# Patient Record
Sex: MALE | Race: WHITE
[De-identification: names, ages, dates, MRNs, and addresses within clinical notes are randomized per-mention and may not be internally consistent; named-entity substitution may affect disease eponyms.]

---

## 2017-09-09 ENCOUNTER — HOSPITAL ENCOUNTER (INPATIENT)
Dept: HOSPITAL 17 - PHED | Age: 77
LOS: 6 days | Discharge: HOME | DRG: 274 | End: 2017-09-15
Attending: HOSPITALIST | Admitting: HOSPITALIST
Payer: MEDICARE

## 2017-09-09 VITALS
DIASTOLIC BLOOD PRESSURE: 76 MMHG | SYSTOLIC BLOOD PRESSURE: 127 MMHG | RESPIRATION RATE: 17 BRPM | OXYGEN SATURATION: 98 % | HEART RATE: 98 BPM

## 2017-09-09 VITALS
HEART RATE: 97 BPM | TEMPERATURE: 98.5 F | RESPIRATION RATE: 20 BRPM | SYSTOLIC BLOOD PRESSURE: 119 MMHG | DIASTOLIC BLOOD PRESSURE: 71 MMHG | OXYGEN SATURATION: 98 %

## 2017-09-09 VITALS
OXYGEN SATURATION: 97 % | HEART RATE: 80 BPM | DIASTOLIC BLOOD PRESSURE: 65 MMHG | SYSTOLIC BLOOD PRESSURE: 121 MMHG | RESPIRATION RATE: 18 BRPM

## 2017-09-09 VITALS
RESPIRATION RATE: 18 BRPM | HEART RATE: 80 BPM | DIASTOLIC BLOOD PRESSURE: 87 MMHG | SYSTOLIC BLOOD PRESSURE: 132 MMHG | OXYGEN SATURATION: 98 %

## 2017-09-09 VITALS
RESPIRATION RATE: 18 BRPM | SYSTOLIC BLOOD PRESSURE: 125 MMHG | DIASTOLIC BLOOD PRESSURE: 71 MMHG | OXYGEN SATURATION: 98 % | HEART RATE: 82 BPM

## 2017-09-09 VITALS
SYSTOLIC BLOOD PRESSURE: 133 MMHG | RESPIRATION RATE: 18 BRPM | OXYGEN SATURATION: 99 % | HEART RATE: 100 BPM | DIASTOLIC BLOOD PRESSURE: 82 MMHG

## 2017-09-09 VITALS — WEIGHT: 160.94 LBS | BODY MASS INDEX: 20.65 KG/M2 | HEIGHT: 74 IN

## 2017-09-09 VITALS
RESPIRATION RATE: 18 BRPM | HEART RATE: 64 BPM | OXYGEN SATURATION: 98 % | DIASTOLIC BLOOD PRESSURE: 65 MMHG | SYSTOLIC BLOOD PRESSURE: 116 MMHG

## 2017-09-09 VITALS
DIASTOLIC BLOOD PRESSURE: 76 MMHG | RESPIRATION RATE: 16 BRPM | SYSTOLIC BLOOD PRESSURE: 127 MMHG | OXYGEN SATURATION: 98 % | TEMPERATURE: 98 F | HEART RATE: 101 BPM

## 2017-09-09 VITALS — HEART RATE: 98 BPM

## 2017-09-09 VITALS
HEART RATE: 55 BPM | OXYGEN SATURATION: 98 % | RESPIRATION RATE: 16 BRPM | SYSTOLIC BLOOD PRESSURE: 106 MMHG | DIASTOLIC BLOOD PRESSURE: 62 MMHG | TEMPERATURE: 97.5 F

## 2017-09-09 VITALS — HEART RATE: 96 BPM

## 2017-09-09 VITALS — HEART RATE: 66 BPM

## 2017-09-09 DIAGNOSIS — Z91.040: ICD-10-CM

## 2017-09-09 DIAGNOSIS — K59.00: ICD-10-CM

## 2017-09-09 DIAGNOSIS — I48.91: ICD-10-CM

## 2017-09-09 DIAGNOSIS — R91.1: ICD-10-CM

## 2017-09-09 DIAGNOSIS — I34.0: ICD-10-CM

## 2017-09-09 DIAGNOSIS — I11.0: ICD-10-CM

## 2017-09-09 DIAGNOSIS — I48.92: ICD-10-CM

## 2017-09-09 DIAGNOSIS — R63.4: ICD-10-CM

## 2017-09-09 DIAGNOSIS — E78.5: ICD-10-CM

## 2017-09-09 DIAGNOSIS — Z87.891: ICD-10-CM

## 2017-09-09 DIAGNOSIS — Z95.5: ICD-10-CM

## 2017-09-09 DIAGNOSIS — I34.1: ICD-10-CM

## 2017-09-09 DIAGNOSIS — E03.9: ICD-10-CM

## 2017-09-09 DIAGNOSIS — I25.110: Primary | ICD-10-CM

## 2017-09-09 DIAGNOSIS — I25.2: ICD-10-CM

## 2017-09-09 DIAGNOSIS — Z95.1: ICD-10-CM

## 2017-09-09 DIAGNOSIS — I50.9: ICD-10-CM

## 2017-09-09 LAB
ANION GAP SERPL CALC-SCNC: 7 MEQ/L (ref 5–15)
APTT BLD: 36.6 SEC (ref 24.3–30.1)
APTT BLD: 86.8 SEC (ref 24.3–30.1)
BASOPHILS # BLD AUTO: 0 TH/MM3 (ref 0–0.2)
BASOPHILS NFR BLD: 0.2 % (ref 0–2)
BUN SERPL-MCNC: 25 MG/DL (ref 7–18)
CHLORIDE SERPL-SCNC: 103 MEQ/L (ref 98–107)
CK MB SERPL-MCNC: 10.8 NG/ML (ref 0.5–3.6)
CK SERPL-CCNC: 474 U/L (ref 39–308)
DIGOXIN SERPL-MCNC: 0.7 NG/ML (ref 0.8–2)
EOSINOPHIL # BLD: 0 TH/MM3 (ref 0–0.4)
EOSINOPHIL NFR BLD: 0.5 % (ref 0–4)
ERYTHROCYTE [DISTWIDTH] IN BLOOD BY AUTOMATED COUNT: 14.9 % (ref 11.6–17.2)
GFR SERPLBLD BASED ON 1.73 SQ M-ARVRAT: 54 ML/MIN (ref 89–?)
HCO3 BLD-SCNC: 29.2 MEQ/L (ref 21–32)
HCT VFR BLD CALC: 44.6 % (ref 39–51)
HEMO FLAGS: (no result)
INR PPP: 1.1 RATIO
LYMPHOCYTES # BLD AUTO: 1.3 TH/MM3 (ref 1–4.8)
LYMPHOCYTES NFR BLD AUTO: 21.7 % (ref 9–44)
MCH RBC QN AUTO: 29.1 PG (ref 27–34)
MCHC RBC AUTO-ENTMCNC: 32.7 % (ref 32–36)
MCV RBC AUTO: 88.9 FL (ref 80–100)
MONOCYTES NFR BLD: 10.7 % (ref 0–8)
NEUTROPHILS # BLD AUTO: 4 TH/MM3 (ref 1.8–7.7)
NEUTROPHILS NFR BLD AUTO: 66.9 % (ref 16–70)
PLATELET # BLD: 199 TH/MM3 (ref 150–450)
POTASSIUM SERPL-SCNC: 3.9 MEQ/L (ref 3.5–5.1)
PROTHROMBIN TIME: 12.3 SEC (ref 9.8–11.6)
RBC # BLD AUTO: 5.01 MIL/MM3 (ref 4.5–5.9)
SODIUM SERPL-SCNC: 139 MEQ/L (ref 136–145)
WBC # BLD AUTO: 5.9 TH/MM3 (ref 4–11)

## 2017-09-09 PROCEDURE — 74000: CPT

## 2017-09-09 PROCEDURE — 93623 PRGRMD STIMJ&PACG IV RX NFS: CPT

## 2017-09-09 PROCEDURE — C1766 INTRO/SHEATH,STRBLE,NON-PEEL: HCPCS

## 2017-09-09 PROCEDURE — 71250 CT THORAX DX C-: CPT

## 2017-09-09 PROCEDURE — 84484 ASSAY OF TROPONIN QUANT: CPT

## 2017-09-09 PROCEDURE — 78452 HT MUSCLE IMAGE SPECT MULT: CPT

## 2017-09-09 PROCEDURE — C1759 CATH, INTRA ECHOCARDIOGRAPHY: HCPCS

## 2017-09-09 PROCEDURE — 85730 THROMBOPLASTIN TIME PARTIAL: CPT

## 2017-09-09 PROCEDURE — 80048 BASIC METABOLIC PNL TOTAL CA: CPT

## 2017-09-09 PROCEDURE — 80061 LIPID PANEL: CPT

## 2017-09-09 PROCEDURE — 93662 INTRACARDIAC ECG (ICE): CPT

## 2017-09-09 PROCEDURE — 85025 COMPLETE CBC W/AUTO DIFF WBC: CPT

## 2017-09-09 PROCEDURE — 99285 EMERGENCY DEPT VISIT HI MDM: CPT

## 2017-09-09 PROCEDURE — 93320 DOPPLER ECHO COMPLETE: CPT

## 2017-09-09 PROCEDURE — 93306 TTE W/DOPPLER COMPLETE: CPT

## 2017-09-09 PROCEDURE — C1731 CATH, EP, 20 OR MORE ELEC: HCPCS

## 2017-09-09 PROCEDURE — 93005 ELECTROCARDIOGRAM TRACING: CPT

## 2017-09-09 PROCEDURE — C1732 CATH, EP, DIAG/ABL, 3D/VECT: HCPCS

## 2017-09-09 PROCEDURE — 93325 DOPPLER ECHO COLOR FLOW MAPG: CPT

## 2017-09-09 PROCEDURE — 83880 ASSAY OF NATRIURETIC PEPTIDE: CPT

## 2017-09-09 PROCEDURE — A9502 TC99M TETROFOSMIN: HCPCS

## 2017-09-09 PROCEDURE — 71010: CPT

## 2017-09-09 PROCEDURE — 93459 L HRT ART/GRFT ANGIO: CPT

## 2017-09-09 PROCEDURE — C1730 CATH, EP, 19 OR FEW ELECT: HCPCS

## 2017-09-09 PROCEDURE — 93613 INTRACARDIAC EPHYS 3D MAPG: CPT

## 2017-09-09 PROCEDURE — 85027 COMPLETE CBC AUTOMATED: CPT

## 2017-09-09 PROCEDURE — C1769 GUIDE WIRE: HCPCS

## 2017-09-09 PROCEDURE — 92960 CARDIOVERSION ELECTRIC EXT: CPT

## 2017-09-09 PROCEDURE — 80162 ASSAY OF DIGOXIN TOTAL: CPT

## 2017-09-09 PROCEDURE — 82550 ASSAY OF CK (CPK): CPT

## 2017-09-09 PROCEDURE — C2630 CATH, EP, COOL-TIP: HCPCS

## 2017-09-09 PROCEDURE — 85002 BLEEDING TIME TEST: CPT

## 2017-09-09 PROCEDURE — 93656 COMPRE EP EVAL ABLTJ ATR FIB: CPT

## 2017-09-09 PROCEDURE — C1893 INTRO/SHEATH, FIXED,NON-PEEL: HCPCS

## 2017-09-09 PROCEDURE — 82552 ASSAY OF CPK IN BLOOD: CPT

## 2017-09-09 PROCEDURE — 85610 PROTHROMBIN TIME: CPT

## 2017-09-09 PROCEDURE — 93312 ECHO TRANSESOPHAGEAL: CPT

## 2017-09-09 PROCEDURE — 93017 CV STRESS TEST TRACING ONLY: CPT

## 2017-09-09 RX ADMIN — ATORVASTATIN CALCIUM SCH MG: 40 TABLET, FILM COATED ORAL at 21:04

## 2017-09-09 RX ADMIN — METOPROLOL SUCCINATE SCH MG: 25 TABLET, EXTENDED RELEASE ORAL at 21:03

## 2017-09-09 RX ADMIN — DOCUSATE SODIUM SCH MG: 100 CAPSULE, LIQUID FILLED ORAL at 21:04

## 2017-09-09 RX ADMIN — PHENYTOIN SODIUM SCH MLS/HR: 50 INJECTION INTRAMUSCULAR; INTRAVENOUS at 21:03
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
## 2017-09-09 NOTE — RADRPT
EXAM DATE/TIME:  09/09/2017 20:12 

 

[facility] COMPARISON:     

No previous studies available for comparison.

 

                     

INDICATIONS :     

Abdominal pain.

                     

 

MEDICAL HISTORY :     

Myocardial infarction.          

 

SURGICAL HISTORY :     

CABG.   

 

ENCOUNTER:     

Subsequent                                        

 

ACUITY:     

3 months      

 

PAIN SCORE:     

3/10

 

LOCATION:       

Abdomen.

 

FINDINGS:     

Nonspecific gas pattern is seen with gas throughout the gastrointestinal tract. Moderate amount of re
tained stool is present throughout the colon.

 

There is post of pathologic distention free air or mass effect.

 

CONCLUSION:     

Nonspecific gas pattern with moderate amount retained stool in the colon.

 

No evidence of significant ileus or free air.

 

 

 

 Dane Tom MD on September 09, 2017 at 20:44           

Board Certified Radiologist.

 This report was verified electronically.
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
## 2017-09-09 NOTE — RADRPT
EXAM DATE/TIME:  09/09/2017 15:27 

 

[facility] COMPARISON:     

No previous studies available for comparison.

 

                     

INDICATIONS :     

Short of breath, chest pains for 2 months, worse today

                     

 

MEDICAL HISTORY :     

Myocardial infarction.          

 

SURGICAL HISTORY :     

CABG.   

 

ENCOUNTER:     

Initial                                        

 

ACUITY:     

2 months      

 

PAIN SCORE:     

6/10

 

LOCATION:     

Bilateral chest 

 

FINDINGS:     

Single AP view of the chest. 2.8 cm nodular density in the right upper lung zone may represent bony h
ypertrophy at the first costochondral junction. Lungs otherwise clear. Median sternotomy wires. Cardi
omediastinal silhouette within normal limits. Minimal blunting left costophrenic sulcus. No evidence 
of pneumothorax.

 

CONCLUSION:     

1. Nodular density right upper lung zone in region of first costochondral junction may represent bony
 hypertrophy. Recommend followup PA and lateral views.

 

2. Minimal blunting left costophrenic sulcus indicating small pleural effusion versus scarring. 

 

 Carl Lopez MD on September 09, 2017 at 15:38           

Board Certified Radiologist.

 This report was verified electronically.

## 2017-09-10 VITALS — HEART RATE: 75 BPM

## 2017-09-10 VITALS — HEART RATE: 63 BPM

## 2017-09-10 VITALS
DIASTOLIC BLOOD PRESSURE: 77 MMHG | RESPIRATION RATE: 18 BRPM | SYSTOLIC BLOOD PRESSURE: 142 MMHG | TEMPERATURE: 97.6 F | OXYGEN SATURATION: 96 % | HEART RATE: 74 BPM

## 2017-09-10 VITALS
RESPIRATION RATE: 18 BRPM | HEART RATE: 102 BPM | OXYGEN SATURATION: 97 % | SYSTOLIC BLOOD PRESSURE: 108 MMHG | DIASTOLIC BLOOD PRESSURE: 63 MMHG | TEMPERATURE: 97.8 F

## 2017-09-10 VITALS — HEART RATE: 99 BPM

## 2017-09-10 VITALS
DIASTOLIC BLOOD PRESSURE: 55 MMHG | TEMPERATURE: 97.6 F | HEART RATE: 76 BPM | RESPIRATION RATE: 16 BRPM | OXYGEN SATURATION: 97 % | SYSTOLIC BLOOD PRESSURE: 95 MMHG

## 2017-09-10 VITALS
SYSTOLIC BLOOD PRESSURE: 128 MMHG | OXYGEN SATURATION: 97 % | TEMPERATURE: 97.6 F | HEART RATE: 84 BPM | DIASTOLIC BLOOD PRESSURE: 55 MMHG | RESPIRATION RATE: 18 BRPM

## 2017-09-10 VITALS
TEMPERATURE: 98.5 F | RESPIRATION RATE: 16 BRPM | SYSTOLIC BLOOD PRESSURE: 114 MMHG | DIASTOLIC BLOOD PRESSURE: 63 MMHG | HEART RATE: 70 BPM | OXYGEN SATURATION: 98 %

## 2017-09-10 VITALS — HEART RATE: 66 BPM

## 2017-09-10 VITALS — HEART RATE: 67 BPM

## 2017-09-10 VITALS
OXYGEN SATURATION: 98 % | TEMPERATURE: 98.4 F | RESPIRATION RATE: 18 BRPM | DIASTOLIC BLOOD PRESSURE: 88 MMHG | SYSTOLIC BLOOD PRESSURE: 126 MMHG | HEART RATE: 86 BPM

## 2017-09-10 VITALS — HEART RATE: 58 BPM

## 2017-09-10 VITALS — HEART RATE: 100 BPM

## 2017-09-10 VITALS — HEART RATE: 55 BPM

## 2017-09-10 VITALS — HEART RATE: 50 BPM

## 2017-09-10 VITALS — HEART RATE: 94 BPM

## 2017-09-10 VITALS — HEART RATE: 60 BPM

## 2017-09-10 VITALS — HEART RATE: 82 BPM

## 2017-09-10 VITALS — HEART RATE: 87 BPM

## 2017-09-10 VITALS — HEART RATE: 53 BPM

## 2017-09-10 LAB
ANION GAP SERPL CALC-SCNC: 5 MEQ/L (ref 5–15)
APTT BLD: 45.1 SEC (ref 24.3–30.1)
APTT BLD: 49.6 SEC (ref 24.3–30.1)
BASOPHILS # BLD AUTO: 0 TH/MM3 (ref 0–0.2)
BASOPHILS NFR BLD: 0.5 % (ref 0–2)
BUN SERPL-MCNC: 22 MG/DL (ref 7–18)
CHLORIDE SERPL-SCNC: 106 MEQ/L (ref 98–107)
CK MB SERPL-MCNC: 9.2 NG/ML (ref 0.5–3.6)
CK SERPL-CCNC: 359 U/L (ref 39–308)
EOSINOPHIL # BLD: 0.1 TH/MM3 (ref 0–0.4)
EOSINOPHIL NFR BLD: 2.9 % (ref 0–4)
ERYTHROCYTE [DISTWIDTH] IN BLOOD BY AUTOMATED COUNT: 14.8 % (ref 11.6–17.2)
GFR SERPLBLD BASED ON 1.73 SQ M-ARVRAT: 80 ML/MIN (ref 89–?)
HCO3 BLD-SCNC: 28.1 MEQ/L (ref 21–32)
HCT VFR BLD CALC: 41.3 % (ref 39–51)
HDLC SERPL-MCNC: 72 MG/DL (ref 40–60)
HEMO FLAGS: (no result)
LDLC SERPL-MCNC: 43 MG/DL (ref 0–99)
LYMPHOCYTES # BLD AUTO: 1.2 TH/MM3 (ref 1–4.8)
LYMPHOCYTES NFR BLD AUTO: 31.7 % (ref 9–44)
MCH RBC QN AUTO: 29.7 PG (ref 27–34)
MCHC RBC AUTO-ENTMCNC: 33.1 % (ref 32–36)
MCV RBC AUTO: 89.7 FL (ref 80–100)
MONOCYTES NFR BLD: 11.1 % (ref 0–8)
NEUTROPHILS # BLD AUTO: 2 TH/MM3 (ref 1.8–7.7)
NEUTROPHILS NFR BLD AUTO: 53.8 % (ref 16–70)
PLATELET # BLD: 153 TH/MM3 (ref 150–450)
POTASSIUM SERPL-SCNC: 4.4 MEQ/L (ref 3.5–5.1)
RBC # BLD AUTO: 4.6 MIL/MM3 (ref 4.5–5.9)
SODIUM SERPL-SCNC: 139 MEQ/L (ref 136–145)
WBC # BLD AUTO: 3.8 TH/MM3 (ref 4–11)

## 2017-09-10 RX ADMIN — DIGOXIN SCH MG: 125 TABLET ORAL at 08:32

## 2017-09-10 RX ADMIN — LEVOTHYROXINE SODIUM SCH MCG: 75 TABLET ORAL at 05:53

## 2017-09-10 RX ADMIN — DOCUSATE SODIUM SCH MG: 100 CAPSULE, LIQUID FILLED ORAL at 21:00

## 2017-09-10 RX ADMIN — DOCUSATE SODIUM SCH MG: 100 CAPSULE, LIQUID FILLED ORAL at 08:32

## 2017-09-10 RX ADMIN — ATORVASTATIN CALCIUM SCH MG: 40 TABLET, FILM COATED ORAL at 21:31

## 2017-09-10 RX ADMIN — ISOSORBIDE MONONITRATE SCH MG: 30 TABLET, EXTENDED RELEASE ORAL at 05:53

## 2017-09-10 RX ADMIN — ASPIRIN 81 MG SCH MG: 81 TABLET ORAL at 08:32

## 2017-09-10 RX ADMIN — DILTIAZEM HYDROCHLORIDE SCH MG: 240 CAPSULE, EXTENDED RELEASE ORAL at 08:32

## 2017-09-10 RX ADMIN — ACYCLOVIR SCH TAB: 800 TABLET ORAL at 08:31

## 2017-09-10 RX ADMIN — BISACODYL SCH MG: 5 TABLET, COATED ORAL at 08:31

## 2017-09-10 RX ADMIN — PHENYTOIN SODIUM SCH MLS/HR: 50 INJECTION INTRAMUSCULAR; INTRAVENOUS at 17:02

## 2017-09-10 RX ADMIN — METOPROLOL SUCCINATE SCH MG: 25 TABLET, EXTENDED RELEASE ORAL at 21:31

## 2017-09-10 NOTE — RADRPT
EXAM DATE/TIME:  09/10/2017 11:52 

 

[facility] COMPARISON:     

No previous studies available for comparison.

 

 

INDICATIONS :     

Chest pain for 1 day. Angina. Myocardial infarction.

                           

 

DOSE:     

25.5 mCi Tc99m Myoview at stress.

                     8.7 mCi Tc99m Myoview at rest.

                     0.4 mg Lexiscan

                       

 

 

STRESS SYMPTOMS:      

None.

                       

 

 

EJECTION FRACTION:       

47%

                       

 

MEDICAL HISTORY :     

Cardiovascular disease. Congestive heart failure. Hypertension. 

 

SURGICAL HISTORY :      

CABG Coronary artery stent.  

 

ENCOUNTER:     

Initial

 

ACUITY:     

1 day

 

PAIN SCALE:     

4/10

 

LOCATION:      

Bilateral chest 

 

TECHNIQUE:     

The patient underwent pharmacologic stress with infusion of prescribed dose.  Continuous ECG tracing 
was monitored during stress.  Gated SPECT imaging was performed after stress and conventional SPECT i
maging was performed at rest.  The examination was performed on a SPECT/CT scanner, both attenuation 
and non-corrected datasets were reviewed.

 

FINDINGS:     

 

DISTRIBUTION:     

The maximum perfused segment at stress is in the inferior wall.

 

PERFUSION STUDY:     

Larged fixed perfusion abnormality is identified involving the anterior wall, lateral wall and apex. 
There is mild reperfusion along the inferoseptal portion of the defect. No other reversible abnormali
ties are noted.

 

GATED STUDY:     

Moderate generalized hypokinesia is noted. Ejection fraction is 47%.

 

CONCLUSION:     

Large predominantly fixed perfusion defect involving the anterior wall, lateral wall and periapical r
egion. Mild reperfusion is identified along the inferoseptal and inferoapical region of the defect.

 

RISK CATEGORY:     High (>3% Annual Mortality Rate)

 

 

 

 

 Dane Tom MD on September 10, 2017 at 13:29           

Board Certified Radiologist.

 This report was verified electronically.

## 2017-09-10 NOTE — ECHRPT
Indication:   shortness of breath

 

 CONCLUSIONS

 The left ventricular systolic function is low normal with an estimated ejection fraction in the rang
e of 50-

 55%. 

 Wall thickness is measured at the upper limits of normal. 

 No regional wall motion abnormalities are present. 

 The left atrial size is moderately dilated. 

 The right atrial size is mildly dilated. 

 Bileaflet mitral valve prolapse  (posterior>anterior.) . 

 Mild thickening of the mitral valve leaflets

 Mitral annular calcification is present. 

 Mild eccentric mitral regurgitation; may be underestimated by TTE

 Aortic valve sclerosis is present. 

 Mild aortic valve regurgitation. 

 There is trace tricuspid valve regurgitation. 

 There is estimated mild pulmonary hypertension present (range 40-50 mmHg). 

 The inferior vena cava is dilated. 

 

 BP:  126   / 88      HR: 101                      Rhythm:           Atrial fibrillation

 

 MEASUREMENTS  (Male / Female) Normal Values       Technical Quality:Good

 2D ECHO

 LV Diastolic Diameter PLAX        5.6 cm                4.2 - 5.9 / 3.9 - 5.3 cm

 LV Systolic Diameter PLAX         4.1 cm                

 IVS Diastolic Thickness           1.2 cm                0.6 - 1.0 / 0.6 - 0.9 cm

 LVPW Diastolic Thickness          1.2 cm                0.6 - 1.0 / 0.6 - 0.9 cm

 LV Relative Wall Thickness        0.4                   

 RV Internal Dim ED PLAX           3.3 cm                

 LVOT Diameter                     2.0 cm                

 LA Systolic Diameter LX           5.2 cm                3.0 - 4.0 / 2.7 - 3.8 cm

 LV Ejection Fraction MOD 4C       54.8 %                

 LV Cardiac Index MOD 4C           4102.8 cm/minm     

 LV Ejection Fraction 4C AL        55.4 %                

 LV Cardiac Index 4C AL            4268.4 cm/minm     

 

 M-MODE

 Aortic Root Diameter MM           3.6 cm                

 LA Systolic Diameter MM           5.2 cm                

 LA Ao Ratio MM                    1.4                   

 AV Cusp Separation MM             2.3 cm                

 

 DOPPLER

 AV Peak Velocity                  115.0 cm/s            

 AV Peak Gradient                  5.3 mmHg              

 AI Peak Velocity                  436.0 cm/s            

 AI Peak Gradient                  76.0 mmHg             

 AI Pressure Half Time             456.0 ms              

 LVOT Peak Velocity                84.4 cm/s             

 LVOT Peak Gradient                2.8 mmHg              

 AV Area Cont Eq pk                2.3 cm               

 MV Area PHT                       4.0 cm               

 Mitral E Point Velocity           124.0 cm/s            

 Mitral A Point Velocity           54.8 cm/s             

 Mitral E to A Ratio               2.3                   

 LV E' Lateral Velocity            15.1 cm/s             

 Mitral E to LV E' Lateral Ratio   8.2                   

 LV E' Septal Velocity             2.3 cm/s              

 Mitral E to LV E' Septal Ratio    53.0                  

 TR Peak Velocity                  296.0 cm/s            

 TR Peak Gradient                  35.0 mmHg             

 

 

 FINDINGS

 

 LEFT VENTRICLE

 The left ventricular systolic function is low normal with an estimated ejection fraction in the rang
e of 50-

 55%. 

 Wall thickness is measured at the upper limits of normal. 

 No regional wall motion abnormalities are present. 

 

 RIGHT VENTRICLE

 Normal right ventricular size and systolic function.  

 

 LEFT ATRIUM

 The left atrial size is moderately dilated. 

 

 RIGHT ATRIUM

 The right atrial size is mildly dilated. 

 

 ATRIAL SEPTUM

 Normal atrial septal thickness without atrial level shunting by limited color doppler interrogation.
  

 

 AORTA

 The aortic root and proximal ascending aorta are normal in size on limited imaging.  

 

 MITRAL VALVE

 Bileaflet mitral valve prolapse. 

 Mild thickening of the mitral valve leaflets. 

 Mitral annular calcification is present. 

 Mild eccentric mitral regurgitation; may be underestimated by TTE

 

 AORTIC VALVE

 Aortic valve sclerosis is present. 

 Mild aortic valve regurgitation. 

 

 TRICUSPID VALVE

 There is trace tricuspid valve regurgitation. 

 There is estimated mild pulmonary hypertension present (range 40-50 mmHg). 

 

 PULMONARY VALVE

 The pulmonary valve is not well visualized.  

 

 VESSELS

 The inferior vena cava is dilated. 

 

 PERICARDIUM

 No pericardial effusion.  

 

 

 

 

  Choco Ruiz MD

  (Electronically Signed)

  Final Date:10 September 2017 10:59

## 2017-09-10 NOTE — EKG
Date Performed: 09/10/2017       Time Performed: 03:34:24

 

PTAGE:      77 years

 

EKG:      Atrial flutter Possible left atrial abnormality Left axis deviation Inferior infarct - age 
undetermined Lateral ST-T changes are nonspecific Low QRS voltages in limb leads Abnormal ECG

 

PREVIOUS TRACING       : 09/09/2017 21.23 Since prior tracing, ventricular response atrial flutter is
 slightly faster.

 

DOCTOR:   Choco Riuz  Interpretating Date/Time  09/10/2017 09:56:14

## 2017-09-10 NOTE — RADRPT
EXAM DATE/TIME:  09/10/2017 13:16 

 

[facility] COMPARISON:     

No previous studies available for comparison.

 

 

INDICATIONS :     

Abnormal chest xray.

                      

 

RADIATION DOSE:     

5.30 CTDIvol (mGy) 

 

 

MEDICAL HISTORY :     

Hypertension. Congestive heart failure. Myocardial infarction. Hypertension.

 

SURGICAL HISTORY :      

CABG  

 

ENCOUNTER:      

Initial

 

ACUITY:      

1 day

 

PAIN SCALE:      

0/10

 

LOCATION:        

chest 

 

TECHNIQUE:      

Volumetric scanning of the chest was performed.  Using automated exposure control and adjustment of t
he mA and/or kV according to patient size, radiation dose was kept as low as reasonably achievable to
 obtain optimal diagnostic quality images.  DICOM format image data is available electronically for r
eview and comparison.  

 

Follow-up recommendations for detected pulmonary nodules are based at a minimum on nodule size and pa
tient risk factors according to Fleischner Society Guidelines.

 

FINDINGS:     

 

LUNGS:     

Postsurgical pleural-parenchymal scarring is identified in the left lung base. 4 mm nodule is identif
ied in the superior segment of the left lower lobe. There is no evidence of consolidating airspace di
sease.

 

PLEURAE:     

Left basilar pleural scarring

 

MEDIASTINUM:     

Heart is mildly enlarged. Calcific coronary artery disease is noted. Postsurgical changes from prior 
CABG are identified.

 

AXILLAE:     

Within normal limits.  No lymphadenopathy.

 

MUSCULOSKELETAL:     

Within normal limits for patient age.

 

MISCELLANEOUS:     

Gallstones are identified in the gallbladder.

 

CONCLUSION:     

1. Left basilar postsurgical scarring following CABG.

2. Mild cardiomegaly.

3. No acute cardiopulmonary process.

4. 4 mm left lower lobe nodule

5. Cholelithiasis

 

 

 

 

 Dane Tom MD on September 10, 2017 at 14:18           

Board Certified Radiologist.

 This report was verified electronically.
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
## 2017-09-10 NOTE — EKG
Date Performed: 09/09/2017       Time Performed: 21:23:32

 

PTAGE:      77 years

 

EKG:      Atrial flutter with PVC(s) or aberrant ventricular conduction Possible left anterior fascic
ular block Inferior infarct - age undetermined Lateral ST-T changes are nonspecific Low QRS voltages 
in limb leads Abnormal ECG

 

PREVIOUS TRACING       : 09/09/2017 14.04 Since prior tracing, ventricular response atrial flutter is
 slower and PVCs are now seen.

 

DOCTOR:   Choco Ruiz  Interpretating Date/Time  09/10/2017 09:55:29

## 2017-09-10 NOTE — EKG
Date Performed: 09/09/2017       Time Performed: 14:04:28

 

PTAGE:      77 years

 

EKG:      Atrial flutter INTRAVENTRICULAR CONDUCTION DELAY ABNORMAL ECG

 

PREVIOUS TRACING       : 11/06/2013 05.03 Since prior tracing, atrial flutter has replaced Sinus rhyt
hm 

 

 and ST changes are slightly more prominent.

 

DOCTOR:   Choco Ruiz  Interpretating Date/Time  09/10/2017 09:55:07
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
## 2017-09-11 VITALS
OXYGEN SATURATION: 96 % | HEART RATE: 87 BPM | SYSTOLIC BLOOD PRESSURE: 155 MMHG | TEMPERATURE: 98.6 F | DIASTOLIC BLOOD PRESSURE: 77 MMHG | RESPIRATION RATE: 18 BRPM

## 2017-09-11 VITALS — HEART RATE: 66 BPM

## 2017-09-11 VITALS
RESPIRATION RATE: 16 BRPM | OXYGEN SATURATION: 98 % | DIASTOLIC BLOOD PRESSURE: 73 MMHG | SYSTOLIC BLOOD PRESSURE: 122 MMHG | HEART RATE: 61 BPM | TEMPERATURE: 97.8 F

## 2017-09-11 VITALS — HEART RATE: 77 BPM

## 2017-09-11 VITALS
HEART RATE: 100 BPM | OXYGEN SATURATION: 98 % | TEMPERATURE: 97.8 F | SYSTOLIC BLOOD PRESSURE: 129 MMHG | RESPIRATION RATE: 16 BRPM | DIASTOLIC BLOOD PRESSURE: 77 MMHG

## 2017-09-11 VITALS
DIASTOLIC BLOOD PRESSURE: 78 MMHG | OXYGEN SATURATION: 98 % | TEMPERATURE: 98.4 F | SYSTOLIC BLOOD PRESSURE: 118 MMHG | RESPIRATION RATE: 18 BRPM | HEART RATE: 95 BPM

## 2017-09-11 VITALS
DIASTOLIC BLOOD PRESSURE: 74 MMHG | HEART RATE: 74 BPM | SYSTOLIC BLOOD PRESSURE: 118 MMHG | RESPIRATION RATE: 18 BRPM | OXYGEN SATURATION: 99 % | TEMPERATURE: 98.3 F

## 2017-09-11 VITALS — HEART RATE: 98 BPM

## 2017-09-11 VITALS — HEART RATE: 100 BPM

## 2017-09-11 VITALS — OXYGEN SATURATION: 99 %

## 2017-09-11 VITALS
RESPIRATION RATE: 18 BRPM | SYSTOLIC BLOOD PRESSURE: 128 MMHG | OXYGEN SATURATION: 97 % | HEART RATE: 104 BPM | DIASTOLIC BLOOD PRESSURE: 70 MMHG

## 2017-09-11 VITALS — HEART RATE: 96 BPM

## 2017-09-11 VITALS — HEART RATE: 89 BPM

## 2017-09-11 VITALS — HEART RATE: 94 BPM

## 2017-09-11 VITALS — HEART RATE: 64 BPM

## 2017-09-11 VITALS — HEART RATE: 83 BPM

## 2017-09-11 VITALS — HEART RATE: 70 BPM

## 2017-09-11 VITALS — HEART RATE: 71 BPM

## 2017-09-11 LAB — APTT BLD: 46 SEC (ref 24.3–30.1)

## 2017-09-11 PROCEDURE — B2151ZZ FLUOROSCOPY OF LEFT HEART USING LOW OSMOLAR CONTRAST: ICD-10-PCS | Performed by: INTERNAL MEDICINE

## 2017-09-11 PROCEDURE — 4A023N7 MEASUREMENT OF CARDIAC SAMPLING AND PRESSURE, LEFT HEART, PERCUTANEOUS APPROACH: ICD-10-PCS | Performed by: INTERNAL MEDICINE

## 2017-09-11 PROCEDURE — B41F1ZZ FLUOROSCOPY OF RIGHT LOWER EXTREMITY ARTERIES USING LOW OSMOLAR CONTRAST: ICD-10-PCS | Performed by: INTERNAL MEDICINE

## 2017-09-11 PROCEDURE — B2131ZZ FLUOROSCOPY OF MULTIPLE CORONARY ARTERY BYPASS GRAFTS USING LOW OSMOLAR CONTRAST: ICD-10-PCS | Performed by: INTERNAL MEDICINE

## 2017-09-11 PROCEDURE — B2111ZZ FLUOROSCOPY OF MULTIPLE CORONARY ARTERIES USING LOW OSMOLAR CONTRAST: ICD-10-PCS | Performed by: INTERNAL MEDICINE

## 2017-09-11 PROCEDURE — B2181ZZ FLUOROSCOPY OF LEFT INTERNAL MAMMARY BYPASS GRAFT USING LOW OSMOLAR CONTRAST: ICD-10-PCS | Performed by: INTERNAL MEDICINE

## 2017-09-11 RX ADMIN — DOCUSATE SODIUM SCH MG: 100 CAPSULE, LIQUID FILLED ORAL at 08:58

## 2017-09-11 RX ADMIN — HEPARIN SODIUM PRN MLS/HR: 10000 INJECTION, SOLUTION INTRAVENOUS at 21:00

## 2017-09-11 RX ADMIN — ACYCLOVIR SCH TAB: 800 TABLET ORAL at 09:05

## 2017-09-11 RX ADMIN — METOPROLOL SUCCINATE SCH MG: 25 TABLET, EXTENDED RELEASE ORAL at 21:11

## 2017-09-11 RX ADMIN — ISOSORBIDE MONONITRATE SCH MG: 30 TABLET, EXTENDED RELEASE ORAL at 06:07

## 2017-09-11 RX ADMIN — DIGOXIN SCH MG: 125 TABLET ORAL at 08:57

## 2017-09-11 RX ADMIN — BISACODYL SCH MG: 5 TABLET, COATED ORAL at 08:58

## 2017-09-11 RX ADMIN — DOCUSATE SODIUM SCH MG: 100 CAPSULE, LIQUID FILLED ORAL at 21:10

## 2017-09-11 RX ADMIN — ATORVASTATIN CALCIUM SCH MG: 40 TABLET, FILM COATED ORAL at 21:10

## 2017-09-11 RX ADMIN — DILTIAZEM HYDROCHLORIDE SCH MG: 240 CAPSULE, EXTENDED RELEASE ORAL at 09:01

## 2017-09-11 RX ADMIN — ASPIRIN 81 MG SCH MG: 81 TABLET ORAL at 08:57

## 2017-09-11 RX ADMIN — LEVOTHYROXINE SODIUM SCH MCG: 75 TABLET ORAL at 06:07

## 2017-09-11 NOTE — MA
cc:

ERICA CHACON M.D.

****

 

 

DATE:

 

PROCEDURE PERFORMED

Left heart catheterization, left ventriculography, coronary angiography, bypass

graft angiography including left internal mammary arteriography, right femoral

angiography with Angio-Seal placement.

 

BRIEF HISTORY

Luc Wagner is a 77-year-old man with known coronary artery disease,

admitted to the hospital with angina and recurrent atrial flutter.  Nuclear

stress test was high-risk positive and for this reason, cardiac catheterization

was being performed.

 

DESCRIPTION OF PROCEDURE

The patient was brought to cardiac cath lab in a fasting state.  Using 1%

lidocaine for local anesthesia, a 6.5-Danish sheath was inserted in the right

femoral artery.  Left ventricular pressure was then recorded using a pigtail

catheter followed by left ventriculography and then a pullback.  Coronary

angiography was then completed using a left 5 Francois for left coronary artery

and a right 4 Francois for the right coronary artery.  Angiography of the two

vein grafts was performed using a right Francois catheter.  Angiography of

internal mammary bypass was performed using an MATTI catheter.  Angiography was

then obtained of the right femoral artery via the sheath followed by

uncomplicated Angio-Seal placement with good hemostasis.

There were no complications.

 

FINDINGS

 

I.   HEMODYNAMICS:  Left ventricular pressure was 100 systolic

     with an end-diastolic pressure of 8, aortic pressure was

     110/68 with a mean of 86.

 

II.  LEFT VENTRICULOGRAPHY:  Left ventriculography showed a

     peculiar shaped left ventricle.  Ejection fraction appears

     to be about 40%. There is 1+ mitral regurgitation.

 

 

 

 

III.  CORONARY ANGIOGRAPHY

 

The left main coronary artery is very short with flush occlusion of the ostium

of the LAD.

 

The circumflex artery is patent with irregularities throughout.

 

The right coronary artery is dominant with 30% proximal and 20% proximal to mid

stenoses and mild irregularities.

 

IV.   BYPASS GRAFTS

 

The left internal mammary bypass graft to the mid LAD is widely patent.

 

Saphenous vein graft to the major diagonal branch is widely patent.  It is

almost like a ramus intermediate type vessel.

 

The saphenous vein graft to a very distal small posterolateral branch is widely

patent.

 

CONCLUSIONS

1. Normal hemodynamics.

2. Moderately impaired LV function with estimated ejection fraction of 40%.

3. Patent LAD, diagonal grafts were totally occluded, LAD.

     Patent vein graft to a small distal posterolateral branch of

     the circumflex artery.  Only mild disease, right coronary

     artery.

 

RECOMMENDATIONS

Medical therapy for the coronary disease.  Will consult Dr. Tariq for the

atrial flutter.

 

                              _________________________________

                                     MD RUSSEL Siu/IVAN

D:  9/11/2017/11:13 AM

T:  9/11/2017/12:41 PM

Visit #:  [id number]

Job #:  [id number]
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
## 2017-09-11 NOTE — PD.CARD.PN
Subjective


Subjective Remarks


no chest pain this AM





Objective


Medications





Current Medications








 Medications


  (Trade)  Dose


 Ordered  Sig/Meenakshi


 Route  Start Time


 Stop Time Status Last Admin


 


  (Nitrostat Sl)  0.4 mg  Q5M  PRN


 SL  9/9/17 18:30


     


 


 


  (Lipitor)  40 mg  HS


 PO  9/9/17 21:00


    9/10/17 21:31


 


 


  (Lanoxin)  0.125 mg  DAILY


 PO  9/10/17 09:00


    9/10/17 08:32


 


 


  (Cardizem Cd)  240 mg  DAILY


 PO  9/10/17 09:00


    9/10/17 08:32


 


 


  (Imdur)  30 mg  DAILY@0700


 PO  9/10/17 07:00


    9/11/17 06:07


 


 


  (Synthroid)  75 mcg  DAILY@0600


 PO  9/10/17 06:00


    9/11/17 06:07


 


 


  (Toprol Xl)  75 mg  HS


 PO  9/9/17 21:00


    9/10/17 21:31


 


 


  (Theragran)  1 tab  DAILY


 PO  9/10/17 09:00


    9/10/17 08:31


 


 


  (Colace)  100 mg  BID


 PO  9/9/17 21:00


    9/10/17 08:32


 


 


  (Dulcolax Ec)  10 mg  DAILY


 PO  9/10/17 09:00


    9/10/17 08:31


 


 


  (Lactulose Liq)  30 ml  DAILY  PRN


 PO  9/9/17 20:00


     


 


 


  (Zofran Inj)  4 mg  Q6HR  PRN


 IV PUSH  9/9/17 20:15


     


 


 


  (Aspirin Chew)  81 mg  DAILY


 CHEW  9/10/17 09:00


    9/10/17 08:32


 


 


  (Pradaxa)  150 mg  BID


 PO  9/11/17 16:00


   UNV  


 








Vital Signs / I&O





Vital Signs








  Date Time  Temp Pulse Resp B/P (MAP) Pulse Ox O2 Delivery O2 Flow Rate FiO2


 


9/11/17 08:00  98      


 


9/11/17 07:00 98.4 99 18 118/78 (91) 98   


 


9/11/17 07:00  95      


 


9/11/17 06:03  96      


 


9/11/17 05:00  70      


 


9/11/17 04:00  83      


 


9/11/17 03:00 97.8 90 16 122/73 (89) 98   


 


9/11/17 03:00  61      


 


9/11/17 02:07  89      


 


9/11/17 01:00  98      


 


9/11/17 00:00  98      


 


9/10/17 23:00 97.8 102 18 108/63 (78) 97   


 


9/10/17 23:00  100      


 


9/10/17 22:00  60      


 


9/10/17 21:00  50      


 


9/10/17 20:00  66      


 


9/10/17 19:48        21


 


9/10/17 19:00 97.6 55 16 95/55 (68) 97   


 


9/10/17 19:00  76      


 


9/10/17 18:00  67      


 


9/10/17 17:00  58      


 


9/10/17 16:00  87      


 


9/10/17 15:00  74      


 


9/10/17 15:00 97.6 74 18 142/77 (98) 96   


 


9/10/17 14:00  75      


 


9/10/17 11:00  84      


 


9/10/17 11:00 97.6 84 18 128/55 (79) 97   


 


9/10/17 10:00  82      


 


9/10/17 09:00  99      














I/O      


 


 9/10/17 9/10/17 9/10/17 9/11/17 9/11/17 9/11/17





 07:00 15:00 23:00 07:00 15:00 23:00


 


Intake Total 997 ml  600 ml 1050 ml  


 


Output Total 350 ml  1100 ml 600 ml  


 


Balance 647 ml  -500 ml 450 ml  


 


      


 


Intake Oral 480 ml  600 ml 480 ml  


 


IV Total 517 ml   570 ml  


 


Output Urine Total 350 ml  1100 ml 600 ml  


 


# Voids 1     








Physical Exam


Alert


Chest clear


CV S1S2 RRR


abd benign


Ext no edema


Laboratory





Laboratory Tests








Test


  9/10/17


15:32 9/11/17


04:50


 


Activated Partial


Thromboplast Time 45.1 SEC 


  46.0 SEC 


 











Assessment and Plan


Problem List:  


(1) Atrial flutter


ICD Codes:  I48.92 - Unspecified atrial flutter


Plan:  Recurrent. Plan cath first then consult Dr. Tariq. He is s/p 2 previous 

ablations.





(2) CAD (coronary artery disease)


ICD Codes:  I25.10 - Atherosclerotic heart disease of native coronary artery 

without angina pectoris


Status:  Chronic


Plan:  Prior CABG. MI ruled out. SPECT abnl. Informed consent for cath.














Ta Johnson MD Sep 11, 2017 08:28

## 2017-09-11 NOTE — CATHPROC
[facility] HIS Report

Study Information

Study Number    Admission          Scheduled Start             Study Start

 

[id number]    Sep 9 2017 3:57PM      09/11/2017               Sep 11 2017 10:11AM

 

Universal Service

 

Cardiac Catheterization

 

Admit Source               Facility Department

 

Other                  [facility] - Cath Lab

 

Physician and Clinical Staff

Initial Ta Manning          Circulator     Mohini Fisher,CHELSEY

 

                         Circulator     Olive Kirby,CHELSEY

 

                         Recorder      Sherri, Warren,RT(R)

 

                         Scrub        Ilana Yanez,RRT TECH2

 

Procedures Performed

Procedure                    Location (Site)           Vessel Name

 

Angiogram LV                   LV                 Ventricle

Coronary Angiograms                LCA                Left Coronary

Coronary Angiograms                RCA                Right Coronary

Coronary Angiograms                LIMA-LAD              Left Coronary

Coronary Angiograms                SVG-DIAG              Left Coronary

Coronary Angiograms                SVG-PDA              Right Coronary

Equipment

Time          Description           Size          Mfg Part Number  Used/Scraped

                  TRANSDUCER, TRUWAVE                   [id number]

10:29   [employer]REOLA REDDING                       *                    Used

                  W/STOCKCOCK                       *[id number]

                                              534-660T



                                              *[id number]

                                              534-617T



                                              *[id number]

                                              534-622T



                                              *[id number]

                                              534-621T



                                              *[id number]

                  PIGTAIL ANG. 145 INFINITI                [id number]



                  CATHETER                        *[id number]

                                              [id number]

11:00   [employer] ANGIOSEAL, FR6 VIP          FR 6                   Used

                                              *[id number]

                                              [id number]

10:29   [employer]    PACK, CCL CUSTOM         *                    Used

                                              *[id number]

                                              [id number]

10:29   [employer]       PEN, SKIN DUAL W/ RULER     *                    Used

                                              *[id number]

                                              [id number]

10:29   [employer]       SHEATH, FR6.5 PRELUDE 11CM    FR 6.5         038ACT      Used

                                              *[id number]

                                              [id number]

10:29   [employer]       WIRE, 3MMJ .035 180CM      180CM                  Used

                                              *[id number]

                                              [id number]

10:29   [employer]           MANIFOLD, 4 PORT         *                    Used

                                              *[id number]

10:29   [employer]          OMNIPAQUE, 350 MG, 100ML     100ML         [id number]      Used

                                              [id number]

10:29   [employer]       BLANKET,WARM AIR CCL       *                    Used

                                              *[id number]

 

History: Risk Factors

                    Family History of

Hypertension   Dyslipidemia               Previous MI    Previous Heart Failure

                    Premature CAD

Yes        Yes         No          No        No

Prior Valve

         Prior PCI      Prior PCIDate    Prior CABG    Prior CABGDate

Surgery

No        Yes         01/01/2004      Yes        01/01/2002

         Cerebrovascular   Peripheral Artery  Chronic Lung

On Dialysis                                  Diabetes

         Disease       Disease       Disease

No        No          No          No        No

History: Stress Tests

Stress or Imaging Studies Performed

 

Yes

Standard Exercise Stress

Test

No

 

Stress Echo

 

No

 

Stress Test SPECT      Stress Test SPECT Result      Stress Test SPECT Ischemia Risk/Extent

 

Yes             Positive              High

 

Stress Test CMR

 

No

 

Cardiac CTA         Coronary Calcium Score

 

No             No

 

 

History: Other

Current Smoker    Method      Quit            Packs a Day      Years Used        Pack Years

 

No          Cigarettes    15 Years Ago        1           40            40

 

Labs

Hgb (g/dl)      Hct (%)         RBC (MIL/MM3)      WBC (l/cumm)      Platelets (thousands)

 

11.60-17.00     35.00-51.00       4.00-5.90        4.00-11.00       150..00

 

13.7         41.3           4.6           3.8          153

 

Glucose (mg/dl)   BUN (mg/dl)       Creatinine (mg/dl)    BUN:Creatinine (1:x)

74..00     7.00-18.00        0.50-1.30        10.00-20.00

 

91          22            0.9           24.4

 

Na (meq/l)      K (meq/l)        Cl (meq/l)        CO2 (mmol/L)      Ca (mg/dl)

 

136..00    3.50-5.10        98..00       21.00-32.00      8.50-10.10

 

139         4.4           106           28.1          8.5

 

PT (sec)       PTT (sec)         INR (PTT:PT)

 

9.80-11.60      24.30-30.10        0.90-1.10

 

12.3         46             1.1

 

Troponin I (ng/ml)  CPK-MB (ng/ML)

 

0.02-0.05      0.50-3.60

 

0.02         Not Drawn

 

 

 

 

Medication

Medication Total Dose (Bolus/Oral)

Medication            Total Dosage/Unit

 

1% XYLOCAINE              20 mL

 

VERSED                 1 mg

Medications (Bolus/Oral)

Medication           Time Given         Dosage/Unit       Administered By      Reason

 

VERSED             9/11/2017 10:35:16 AM     1 mg         Ta Johnson

1 mg VERSED given in lab by Ta Johnson in Left Antecubital via Peripheral IV. Ordered by LILIANA Johnson.

 

1% XYLOCAINE          9/11/2017 10:37:27 AM     20 mL         Ta Johnson

20 mL 1% XYLOCAINE given in lab by Ta Johnson in Right Groin via Subcutaneous. Ordered by aT Johnson.

 

Medication (Drip)

Medication           Time Given         Dosage/Unit       Concentration/Unit  Diluent (ml)  Solution

 

IV Solutions          9/11/2017 10:16:16 AM     0 mL (IV)                 500       NaCl .9

Patient arrived on IV Solutions given by Ta Johnson in Left Antecubital via Peripheral IV. Pump/Dr
ip Flow = 20 ml/hr using NaCl .9. Ordered by

Ta Johnson.

 

Initial Case Assessment

Cardiovascular

HR             Rhythm           NIBP             Chest Pain

 

99             sr             130/85            0

 

Edema Present        Skin color             Skin

 

None            Normal               Warm Dry

 

Circulatory - Right Pulses

Dorsalis Pedis       Femoral

 

2              2

 

Scale (0,1,2,3,4,d)

 

Circulatory - Left Pulses

Dorsalis Pedis       Femoral

 

2              2

 

Scale (0,1,2,3,4,d)

 

Neurological State

              Oriented to time-place-

Alert                        Moves all extremities

              person

 

Respiration - General

Respiration Rate

              SpO2 (%)          O2 (lpm)

(B/min)

18             98             0

Final Case Assessment

Cardiovascular

HR           Rhythm          NIBP          Chest Pain

 

90           sr            126/85         0

 

Edema Present      Skin color           Skin

 

None           Normal             Warm Dry

 

Circulatory - Right Pulses

Dorsalis Pedis     Femoral

 

2            2

 

Scale (0,1,2,3,4,d)

 

Circulatory - Left Pulses

Dorsalis Pedis     Femoral

 

2            2

 

Scale (0,1,2,3,4,d)

 

Neurological State

            Oriented to time-place-

Alert                      Moves all extremities

            person

 

Respiration - General

Respiration Rate

            SpO2 (%)         O2 (lpm)

(B/min)

18           99            0

 

Chronological Log

Time    Study Chronological Log

 

10:10:56  Patient arrived via Bed.

 

10:10:58  Patient Name, D.O.B, / Armband Verified By R.N.

 

10:11:00  Consent signed by the physician and the patient and verified by the Cath Lab staff.

 

10:11:02  Pre-op and post- op instructions given; patient acknowledges understanding of instructions.


      Vitals capture started with the following parameters, Patient=Adult, Interval=5 min, Initial Pr
prfapz=598 mmHg,

10:12:35

      Deflation Rate=5 mmHg, Cuff placed on Right Arm

10:13:11  BZ=908 bpm, QXPL=806/91 mmhg, SpO2=98.0 %, Kuhn=2

 

10:14:33  Reference ECG taken

 

10:16:06  Patient has been NPO for Less than 6Hrs.

 

10:16:07  Skin Breakdown-Bandaid present on right leg.

 

10:16:09  Patient Warmer Placed on the Table.

 

10:16:15  A # 20 IV was noted in the Antecubital (left). Grade = 0

 

10:16:15  A # 20 IV was noted in the Forearm (left). Grade = 0

      Patient arrived on IV Solutions given by Ta Johnson in Left Antecubital via Peripheral IV. P
ump/Drip Flow = 20 ml/hr

10:16:16

      using NaCl .9. Ordered by Ta Johnson.

10:16:17  History and physical on the chart or being dictated.

      Assessment: Initial Case, HR=99 BPM, Rhythm=sr, ZVQZ=967/85 mmhg, Chest Pain=0, Edema=None, Col
or=Normal,

      Skin = Warm, Dry

      Right Pulses: Jean Carlos Ped=2, Femoral=2

10:16:19

      Left Pulses: Jean Carlos Ped=2, Femoral=2

      Neurological: State=Alert, Ox3, ZELAYA

      Respiration: Resp=18 B/min, SpO2=98 %, O2=0 lpm

10:18:08  HR=87 bpm, GCNN=614/85 mmhg, SpO2=98.0 %, Resp=19 B/min, Kuhn=2

 

10:19:07  Bilateral groins prepped with 2% chlorhexidine, and with a 3 min. waiting time.

 

10:23:31  HR=97 bpm, TWUQ=262/93 mmhg, SpO2=99.0 %, Resp=26 B/min, Kuhn=2

 

10:28:08  HR=97 bpm, EQOG=980/90 mmhg, SpO2=98.0 %, Resp=13 B/min, Kuhn=2

 

10:28:13  Pressure channel 1 zeroed.

 

10:33:05  HR=97 bpm, GOHW=038/86 mmhg, SpO2=97.0 %, Resp=19 B/min, Kuhn=2

 

10:35:16  1 mg VERSED given in lab by Ta Johnson in Left Antecubital via Peripheral IV. Ordered by
 Ta Johnson.

      Time Out. Correct patient, correct procedure,correct physician, ,power injector loaded with con
trast with surgical team

10:36:02  present. Time Out Concurred by MD, individual staff and CRNA in procedure. Loaded by Arelis blakely, verified

      by Jovita Yanez.

10:36:39  Presedation re-assessment performed by Cath Lab RN.

 

10:36:41  Case Start

 

10:36:43  Verbal Stimulation=2 Physical Stimulation=2 Airway=2 Respiration=2 TOTAL=8. (0=absent, 1=li
mited, 2=present)

 

10:37:27  20 mL 1% XYLOCAINE given in lab by Ta Johnson in Right Groin via Subcutaneous. Ordered b
y Ta Johnson.

 

10:38:08  HR=93 bpm, QNKJ=519/86 mmhg, SpO2=97.0 %, Resp=17 B/min, Kuhn=2

 

10:39:05  Access site was Right Femoral Artery.

 

10:39:23  A SHEATH, FR6.5 PRELUDE 11CM FR 6.5 was advanced into the Fem Art (right) using the Percuta
neous technique.

      A PIGTAIL ANG. 145 INFINITI CATHETER FR 6 was advanced over a wire. OMNIPAQUE, 350 MG, 100ML 10
0ML was

10:40:01

      used for injections.

      Recorded Pressure: LV, HR=95, Condition=Condition 1

10:40:57

      (Left Ventricle) /-2/6

10:41:38  The LV was injected at 10 cc/sec for a total of 40. OMNIPAQUE, 350 MG, 100ML 100ML used.

      Recorded Pressure: LV, Ao, HR=81, Condition=Condition 1

10:43:02  (Left Ventricle) /0/8,

      (Aorta) Ao 112/60/82

10:43:07  HR=95 bpm, ZFCK=473/74 mmhg, SpO2=97.0 %, Resp=19 B/min, Kuhn=2

 

10:43:37  Catheter was removed

      A JL 4.5 INFINITI CATHETER FR 6 was advanced over a wire. OMNIPAQUE, 350 MG, 100ML 100ML was us
ed for

10:44:10

      injections.

      Recorded Pressure: Ao, HR=94, Condition=Condition 1

10:45:25

      (Aorta) Ao 110/68/86

10:45:46  The  LCA was injected and visualized at various angles. OMNIPAQUE, 350 MG, 100ML 100ML used
.

 

10:46:47  Catheter was removed

      A JL 5.0 INFINITI CATHETER FR 6 was advanced over a wire. OMNIPAQUE, 350 MG, 100ML 100ML was us
ed for

10:46:49

      injections.

10:48:06  HR=90 bpm, UNRE=710/79 mmhg, SpO2=97.0 %, Resp=32 B/min, Kuhn=2

 

10:49:28  Catheter was removed

      A JR 4.0 INFINITI CATHETER FR 6 was advanced over a wire. OMNIPAQUE, 350 MG, 100ML 100ML was us
ed for

10:49:30

      injections.

10:51:00  The  RCA was injected and visualized at various angles. OMNIPAQUE, 350 MG, 100ML 100ML used
.

 

10:52:29  The SVG-DIAG was injected and visualized at various angles. OMNIPAQUE, 350 MG, 100ML 100ML 
used.

 

10:53:09  HR=94 bpm, HRHN=472/73 mmhg, SpO2=99.0 %, Resp=19 B/min, Kuhn=2

 

10:53:28  The SVG-PDA was injected and visualized at various angles. OMNIPAQUE, 350 MG, 100ML 100ML u
sed.

10:55:32   Catheter was removed

       A MATTI INFINITI CATHETER FR 6 was advanced over a wire. OMNIPAQUE, 350 MG, 100ML 100ML was used
 for

10:55:34

       injections.

10:56:33   The LIMA-LAD was injected and visualized at various angles. OMNIPAQUE, 350 MG, 100ML 100ML
 used.

 

10:57:36   Catheter was removed

 

10:57:55   Case End

 

10:58:06   An injection in the Fem Art (right) was made through the SHEATH, FR6.5 PRELUDE 11CM FR 6.5
.

 

10:58:08   HR=89 bpm, ZWHT=226/82 mmhg, SpO2=98.0 %, Resp=30 B/min, Kuhn=2

 

10:59:20   ANGIOSEAL, FR6 VIP FR 6 placement in the Fem Art (right)

 

11:00:38   Sterile dressing applied to site

 

11:00:39   No case complications noted.

 

11:00:41   Cine recording checked.

 

11:00:48   Bedside Report will be given.

 

11:00:54   Implantable Device card placed in patient's chart.

 

11:01:05   Contrast Scanned

 

11:03:09   HR=92 bpm, JTXT=866/81 mmhg, SpO2=98.0 %, Resp=18 B/min, Kuhn=2

       Assessment: Final Case, HR=90 BPM, Rhythm=sr, IYCX=829/85 mmhg, Chest Pain=0, Edema=None, [place]
r=Normal,

       Skin = Warm, Dry

       Right Pulses: Jean Carlos Ped=2, Femoral=2

11:07:51

       Left Pulses: Jean Carlos Ped=2, Femoral=2

       Neurological: State=Alert, Ox3, ZELAYA

       Respiration: Resp=18 B/min, SpO2=99 %, O2=0 lpm

11:08:08   MDHY=395/85 mmhg, Kuhn=2

 

11:08:29   Vitals capture stopped.

 

11:12:03   [facility]

 

 

 

 

End Study - Contrast Media Used In Study

Contrast        Total Opened (mL)     Total Used (mL)     Total Wasted (mL)

 

Omnipaque       90            90           0

 

End Study - Maximum Contrast Load

Max Contrast Load (mL)

 

410.1

 

End Study - Radiation Exposure

Fluoro Time

(minutes)

5.1

 

 

End Study - Patient Disposition

Complications     Transferred To

 

           Telemetry Bed

## 2017-09-12 VITALS — HEART RATE: 44 BPM

## 2017-09-12 VITALS
OXYGEN SATURATION: 98 % | DIASTOLIC BLOOD PRESSURE: 64 MMHG | RESPIRATION RATE: 18 BRPM | TEMPERATURE: 98.2 F | HEART RATE: 42 BPM | SYSTOLIC BLOOD PRESSURE: 108 MMHG

## 2017-09-12 VITALS
HEART RATE: 77 BPM | OXYGEN SATURATION: 98 % | TEMPERATURE: 98 F | RESPIRATION RATE: 22 BRPM | SYSTOLIC BLOOD PRESSURE: 110 MMHG | DIASTOLIC BLOOD PRESSURE: 60 MMHG

## 2017-09-12 VITALS
OXYGEN SATURATION: 97 % | SYSTOLIC BLOOD PRESSURE: 111 MMHG | DIASTOLIC BLOOD PRESSURE: 68 MMHG | HEART RATE: 62 BPM | RESPIRATION RATE: 18 BRPM | TEMPERATURE: 98.3 F

## 2017-09-12 VITALS — HEART RATE: 96 BPM

## 2017-09-12 VITALS
RESPIRATION RATE: 16 BRPM | OXYGEN SATURATION: 97 % | SYSTOLIC BLOOD PRESSURE: 132 MMHG | HEART RATE: 83 BPM | TEMPERATURE: 97.9 F | DIASTOLIC BLOOD PRESSURE: 92 MMHG

## 2017-09-12 VITALS — HEART RATE: 60 BPM

## 2017-09-12 VITALS — HEART RATE: 102 BPM

## 2017-09-12 VITALS — HEART RATE: 80 BPM

## 2017-09-12 VITALS — HEART RATE: 78 BPM

## 2017-09-12 VITALS
OXYGEN SATURATION: 98 % | HEART RATE: 100 BPM | RESPIRATION RATE: 20 BRPM | TEMPERATURE: 98.4 F | DIASTOLIC BLOOD PRESSURE: 88 MMHG | SYSTOLIC BLOOD PRESSURE: 127 MMHG

## 2017-09-12 VITALS — HEART RATE: 75 BPM

## 2017-09-12 VITALS — OXYGEN SATURATION: 97 %

## 2017-09-12 VITALS — HEART RATE: 85 BPM

## 2017-09-12 VITALS — HEART RATE: 92 BPM

## 2017-09-12 VITALS — HEART RATE: 95 BPM

## 2017-09-12 VITALS — HEART RATE: 72 BPM

## 2017-09-12 VITALS — HEART RATE: 50 BPM

## 2017-09-12 VITALS — HEART RATE: 90 BPM

## 2017-09-12 VITALS — HEART RATE: 82 BPM

## 2017-09-12 VITALS — HEART RATE: 64 BPM

## 2017-09-12 LAB
ANION GAP SERPL CALC-SCNC: 8 MEQ/L (ref 5–15)
APTT BLD: 37.2 SEC (ref 24.3–30.1)
APTT BLD: 39.4 SEC (ref 24.3–30.1)
APTT BLD: 40.1 SEC (ref 24.3–30.1)
APTT BLD: 46.1 SEC (ref 24.3–30.1)
BASOPHILS # BLD AUTO: 0 TH/MM3 (ref 0–0.2)
BASOPHILS NFR BLD: 0.3 % (ref 0–2)
BUN SERPL-MCNC: 15 MG/DL (ref 7–18)
CHLORIDE SERPL-SCNC: 105 MEQ/L (ref 98–107)
EOSINOPHIL # BLD: 0.1 TH/MM3 (ref 0–0.4)
EOSINOPHIL NFR BLD: 1.4 % (ref 0–4)
ERYTHROCYTE [DISTWIDTH] IN BLOOD BY AUTOMATED COUNT: 14.8 % (ref 11.6–17.2)
GFR SERPLBLD BASED ON 1.73 SQ M-ARVRAT: 81 ML/MIN (ref 89–?)
HCO3 BLD-SCNC: 26.5 MEQ/L (ref 21–32)
HCT VFR BLD CALC: 41.1 % (ref 39–51)
HEMO FLAGS: (no result)
LYMPHOCYTES # BLD AUTO: 1.4 TH/MM3 (ref 1–4.8)
LYMPHOCYTES NFR BLD AUTO: 25.3 % (ref 9–44)
MCH RBC QN AUTO: 30.9 PG (ref 27–34)
MCHC RBC AUTO-ENTMCNC: 34.5 % (ref 32–36)
MCV RBC AUTO: 89.4 FL (ref 80–100)
MONOCYTES NFR BLD: 10.7 % (ref 0–8)
NEUTROPHILS # BLD AUTO: 3.5 TH/MM3 (ref 1.8–7.7)
NEUTROPHILS NFR BLD AUTO: 62.3 % (ref 16–70)
PLATELET # BLD: 146 TH/MM3 (ref 150–450)
POTASSIUM SERPL-SCNC: 4.1 MEQ/L (ref 3.5–5.1)
RBC # BLD AUTO: 4.6 MIL/MM3 (ref 4.5–5.9)
SODIUM SERPL-SCNC: 139 MEQ/L (ref 136–145)
WBC # BLD AUTO: 5.6 TH/MM3 (ref 4–11)

## 2017-09-12 RX ADMIN — LEVOTHYROXINE SODIUM SCH MCG: 75 TABLET ORAL at 06:10

## 2017-09-12 RX ADMIN — ASPIRIN 81 MG SCH MG: 81 TABLET ORAL at 08:18

## 2017-09-12 RX ADMIN — BISACODYL SCH MG: 5 TABLET, COATED ORAL at 08:18

## 2017-09-12 RX ADMIN — ATORVASTATIN CALCIUM SCH MG: 40 TABLET, FILM COATED ORAL at 20:54

## 2017-09-12 RX ADMIN — AMIODARONE HYDROCHLORIDE SCH MG: 200 TABLET ORAL at 20:53

## 2017-09-12 RX ADMIN — DILTIAZEM HYDROCHLORIDE SCH MG: 240 CAPSULE, EXTENDED RELEASE ORAL at 08:17

## 2017-09-12 RX ADMIN — ACYCLOVIR SCH TAB: 800 TABLET ORAL at 08:18

## 2017-09-12 RX ADMIN — DOCUSATE SODIUM SCH MG: 100 CAPSULE, LIQUID FILLED ORAL at 08:18

## 2017-09-12 RX ADMIN — DIGOXIN SCH MG: 125 TABLET ORAL at 08:17

## 2017-09-12 RX ADMIN — AMIODARONE HYDROCHLORIDE SCH MG: 200 TABLET ORAL at 10:30

## 2017-09-12 RX ADMIN — DOCUSATE SODIUM SCH MG: 100 CAPSULE, LIQUID FILLED ORAL at 20:54

## 2017-09-12 RX ADMIN — ISOSORBIDE MONONITRATE SCH MG: 30 TABLET, EXTENDED RELEASE ORAL at 06:10

## 2017-09-12 NOTE — MB
cc:

VESNA HERNANDEZ M.D.

****

 

 

DATE OF CONSULTATION

9/12/2017

 

Electrophysiology consult.

 

REASON FOR CONSULTATION

Atrial fibrillation, atrial flutter.

 

HISTORY OF THE PRESENT ILLNESS

Mr. Wagner is a 77-year-old  gentleman with history of coronary artery

disease, coronary bypass grafting, atrial fibrillation, atrial flutter.  He had

a previous atrial fibrillation and atrial tachycardia ablation and atrial

flutter in November 7, 2013.  Since then the gentleman was doing okay.   He was

admitted on September 10, 2017 due to tachyarrhythmia and chest pain.  Left

heart catheterization was done by Dr. Johnson that indicated nonocclusive

disease.  Heart rate continued to be high. Pradaxa was stopped. He is on

heparin.  I was consulted for evaluation and management.  The chart was

reviewed.  The patient was evaluated.

 

ALLERGIES

LATEX.

 

SOCIAL HISTORY

Gentleman denies smoking and drinking.

 

FAMILY HISTORY

Noncontributory to his current medical condition.

 

MEDICATIONS

He is on:

1. IV Heparin.

2. He is on aspirin 81 mg a day.

3. Lipitor 40 mg a day.

4. He is on digoxin 0.125 mg a day.

5. Cardizem  mg a day.

6. Imdur 30 mg a day.

7. Levoxyl.

8. Metoprolol 75 mg a day.

 

REVIEW OF SYSTEMS

Currently the patient refers no chest pain. No chest discomfort.  No vomiting.

No fevers.  There is some palpitation and shortness of breath.

 

PHYSICAL EXAMINATION

GENERAL: Alert, fully oriented.

VITAL SIGNS: His blood pressure is 127/88, pulse on the monitor is around 110,

105. Respiratory rate 18.

LUNGS: Ventilated.

CARDIOVASCULAR:  S1-S2 tachycardic, irregular. There is a systolic ejection

murmur 2-3/6.

ABDOMEN: Soft. No mass.  No bruit.

EXTREMITIES: No edema.

 

Electrocardiogram on hospitalization indicated atrial fibrillation, atrial

tachycardia, possible atrial flutter.

 

LABORATORY DATA

Hemoglobin 14.2, white blood cell 5.6.

 

Potassium 4.1, creatinine 0.91.

 

Troponin less than 0.02.

 

INR 1.1.

 

ASSESSMENT AND RECOMMENDATIONS

Mr. Wagner currently is in atrial fibrillation, atrial tachyarrhythmia.  Heart

rate very difficult to control.  On digoxin, Cardizem and metoprolol.  I am

going to DC the digoxin and Cardizem.  I am going to add amiodarone.

Electrophysiology study and ablation discussed.  The risks, the nature and the

benefit of the procedure are clearly stated to him. The risks include

pneumothorax, cardiac perforation, stroke and even death.  He understood and

agreed to proceed.  Gentleman had breakfast around 9-9:30 in the morning.

Waiting for clearance from anesthesiologist to schedule the case.  I will keep

the patient in the meantime n.p.o.

 

 

 

                              _________________________________

                              MD ROLANDA Ivory/KK

D:  9/12/2017/10:09 AM

T:  9/12/2017/10:44 PM

Visit #:  [id number]

Job #:  [id number]
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
## 2017-09-13 VITALS
TEMPERATURE: 98.4 F | HEART RATE: 97 BPM | SYSTOLIC BLOOD PRESSURE: 139 MMHG | OXYGEN SATURATION: 97 % | DIASTOLIC BLOOD PRESSURE: 93 MMHG | RESPIRATION RATE: 18 BRPM

## 2017-09-13 VITALS
RESPIRATION RATE: 20 BRPM | HEART RATE: 75 BPM | TEMPERATURE: 98.1 F | DIASTOLIC BLOOD PRESSURE: 78 MMHG | SYSTOLIC BLOOD PRESSURE: 125 MMHG | OXYGEN SATURATION: 96 %

## 2017-09-13 VITALS
HEART RATE: 95 BPM | RESPIRATION RATE: 18 BRPM | SYSTOLIC BLOOD PRESSURE: 115 MMHG | TEMPERATURE: 98 F | OXYGEN SATURATION: 97 % | DIASTOLIC BLOOD PRESSURE: 86 MMHG

## 2017-09-13 VITALS
SYSTOLIC BLOOD PRESSURE: 125 MMHG | RESPIRATION RATE: 18 BRPM | TEMPERATURE: 98 F | HEART RATE: 97 BPM | OXYGEN SATURATION: 97 % | DIASTOLIC BLOOD PRESSURE: 51 MMHG

## 2017-09-13 VITALS — HEART RATE: 82 BPM

## 2017-09-13 VITALS
TEMPERATURE: 98.3 F | DIASTOLIC BLOOD PRESSURE: 56 MMHG | HEART RATE: 61 BPM | RESPIRATION RATE: 17 BRPM | SYSTOLIC BLOOD PRESSURE: 119 MMHG | OXYGEN SATURATION: 97 %

## 2017-09-13 VITALS — HEART RATE: 80 BPM

## 2017-09-13 VITALS
RESPIRATION RATE: 20 BRPM | DIASTOLIC BLOOD PRESSURE: 52 MMHG | HEART RATE: 78 BPM | OXYGEN SATURATION: 96 % | TEMPERATURE: 98.3 F | SYSTOLIC BLOOD PRESSURE: 105 MMHG

## 2017-09-13 VITALS — OXYGEN SATURATION: 97 %

## 2017-09-13 VITALS — HEART RATE: 100 BPM

## 2017-09-13 VITALS — HEART RATE: 90 BPM

## 2017-09-13 VITALS — HEART RATE: 88 BPM

## 2017-09-13 VITALS
OXYGEN SATURATION: 97 % | TEMPERATURE: 98 F | DIASTOLIC BLOOD PRESSURE: 51 MMHG | HEART RATE: 97 BPM | RESPIRATION RATE: 18 BRPM | SYSTOLIC BLOOD PRESSURE: 125 MMHG

## 2017-09-13 VITALS — HEART RATE: 75 BPM

## 2017-09-13 VITALS — HEART RATE: 86 BPM

## 2017-09-13 VITALS — HEART RATE: 72 BPM

## 2017-09-13 VITALS — HEART RATE: 89 BPM

## 2017-09-13 VITALS — HEART RATE: 93 BPM

## 2017-09-13 VITALS — HEART RATE: 96 BPM

## 2017-09-13 VITALS — HEART RATE: 92 BPM

## 2017-09-13 VITALS — HEART RATE: 66 BPM

## 2017-09-13 VITALS — HEART RATE: 74 BPM

## 2017-09-13 LAB
ANION GAP SERPL CALC-SCNC: 8 MEQ/L (ref 5–15)
APTT BLD: 46.3 SEC (ref 24.3–30.1)
APTT BLD: 51.1 SEC (ref 24.3–30.1)
BASOPHILS # BLD AUTO: 0 TH/MM3 (ref 0–0.2)
BASOPHILS NFR BLD: 0.2 % (ref 0–2)
BUN SERPL-MCNC: 11 MG/DL (ref 7–18)
CHLORIDE SERPL-SCNC: 104 MEQ/L (ref 98–107)
EOSINOPHIL # BLD: 0 TH/MM3 (ref 0–0.4)
EOSINOPHIL NFR BLD: 0.8 % (ref 0–4)
ERYTHROCYTE [DISTWIDTH] IN BLOOD BY AUTOMATED COUNT: 15.2 % (ref 11.6–17.2)
GFR SERPLBLD BASED ON 1.73 SQ M-ARVRAT: 75 ML/MIN (ref 89–?)
HCO3 BLD-SCNC: 27.4 MEQ/L (ref 21–32)
HCT VFR BLD CALC: 39.9 % (ref 39–51)
HEMO FLAGS: (no result)
INR PPP: 1 RATIO
LYMPHOCYTES # BLD AUTO: 1.3 TH/MM3 (ref 1–4.8)
LYMPHOCYTES NFR BLD AUTO: 25.2 % (ref 9–44)
MCH RBC QN AUTO: 30.2 PG (ref 27–34)
MCHC RBC AUTO-ENTMCNC: 33.7 % (ref 32–36)
MCV RBC AUTO: 89.5 FL (ref 80–100)
MONOCYTES NFR BLD: 11.3 % (ref 0–8)
NEUTROPHILS # BLD AUTO: 3.1 TH/MM3 (ref 1.8–7.7)
NEUTROPHILS NFR BLD AUTO: 62.5 % (ref 16–70)
PLATELET # BLD: 157 TH/MM3 (ref 150–450)
POTASSIUM SERPL-SCNC: 3.7 MEQ/L (ref 3.5–5.1)
PROTHROMBIN TIME: 11.6 SEC (ref 9.8–11.6)
RBC # BLD AUTO: 4.46 MIL/MM3 (ref 4.5–5.9)
SODIUM SERPL-SCNC: 139 MEQ/L (ref 136–145)
WBC # BLD AUTO: 5 TH/MM3 (ref 4–11)

## 2017-09-13 RX ADMIN — BISACODYL SCH MG: 5 TABLET, COATED ORAL at 08:10

## 2017-09-13 RX ADMIN — AMIODARONE HYDROCHLORIDE SCH MG: 200 TABLET ORAL at 20:21

## 2017-09-13 RX ADMIN — LEVOTHYROXINE SODIUM SCH MCG: 75 TABLET ORAL at 05:29

## 2017-09-13 RX ADMIN — AMIODARONE HYDROCHLORIDE SCH MG: 200 TABLET ORAL at 08:11

## 2017-09-13 RX ADMIN — DOCUSATE SODIUM SCH MG: 100 CAPSULE, LIQUID FILLED ORAL at 20:20

## 2017-09-13 RX ADMIN — ISOSORBIDE MONONITRATE SCH MG: 30 TABLET, EXTENDED RELEASE ORAL at 05:29

## 2017-09-13 RX ADMIN — ATORVASTATIN CALCIUM SCH MG: 40 TABLET, FILM COATED ORAL at 20:21

## 2017-09-13 RX ADMIN — ASPIRIN 81 MG SCH MG: 81 TABLET ORAL at 08:11

## 2017-09-13 RX ADMIN — TEMAZEPAM PRN MG: 15 CAPSULE ORAL at 20:21

## 2017-09-13 RX ADMIN — DILTIAZEM HYDROCHLORIDE SCH MG: 240 CAPSULE, EXTENDED RELEASE ORAL at 08:10

## 2017-09-13 RX ADMIN — ACYCLOVIR SCH TAB: 800 TABLET ORAL at 08:12

## 2017-09-13 RX ADMIN — HEPARIN SODIUM PRN MLS/HR: 10000 INJECTION, SOLUTION INTRAVENOUS at 18:13

## 2017-09-13 RX ADMIN — DOCUSATE SODIUM SCH MG: 100 CAPSULE, LIQUID FILLED ORAL at 08:11

## 2017-09-13 NOTE — HHI.PR
Subjective


Remarks


Feeling ok





Objective





Vital Signs








  Date Time  Temp Pulse Resp B/P (MAP) Pulse Ox O2 Delivery O2 Flow Rate FiO2


 


9/13/17 17:36     97   21


 


9/13/17 16:24 98.3 61 17 119/56 (77) 97   


 


9/13/17 12:00  90      


 


9/13/17 12:00 98.0 97 18 125/51 (75) 97   


 


9/13/17 11:00 98.0 97 18 125/51 (75) 97   


 


9/13/17 09:48     97   


 


9/13/17 08:00 98.4 97 18 139/93 (108) 97   


 


9/13/17 08:00  100      


 


9/13/17 06:00  93      


 


9/13/17 05:00  92      


 


9/13/17 04:00  95      


 


9/13/17 04:00 98.0 95 18 115/86 (96) 97   


 


9/13/17 03:00  90      


 


9/13/17 02:00  93      


 


9/13/17 01:00  74      


 


9/13/17 00:00 98.3 78 20 105/52 (69) 96   


 


9/13/17 00:00  78      


 


9/12/17 23:00  75      


 


9/12/17 22:00  78      


 


9/12/17 21:00  72      


 


9/12/17 20:00  77      


 


9/12/17 20:00 98.0 77 22 110/60 (77) 98   














I/O      


 


 9/12/17 9/12/17 9/12/17 9/13/17 9/13/17 9/13/17





 07:00 15:00 23:00 07:00 15:00 23:00


 


Intake Total 423 ml  600 ml 560 ml  696 ml


 


Output Total 700 ml  750 ml 800 ml  800 ml


 


Balance -277 ml  -150 ml -240 ml  -104 ml


 


      


 


Intake Oral 360 ml  600 ml 480 ml  600 ml


 


IV Total 63 ml   80 ml  96 ml


 


Output Urine Total 700 ml  750 ml 800 ml  800 ml


 


# Bowel Movements   0 0  1








Result Diagram:  


9/12/17 0331 9/12/17 0331





Imaging


Alert, fully oriented





Lungs: ventilated





Heart: s1, S2 irregular, no gallop





Abdomen: soft, no mass





Ext: no edema








Last Impressions








Myocardial Perfusion Scan Nuc Med 9/10/17 0000 Signed





Impressions: 





 Service Date/Time:  Dennis, September 10, 2017 11:52 - CONCLUSION:  Large 





 predominantly fixed perfusion defect involving the anterior wall, lateral wall 





 and periapical region. Mild reperfusion is identified along the inferoseptal 

and 





 inferoapical region of the defect.  RISK CATEGORY:     High (>3%% Annual 





 Mortality Rate)      Dane Tom MD 


 


Chest CT 9/10/17 0000 Signed





Impressions: 





 Service Date/Time:  Dennis, September 10, 2017 13:16 - CONCLUSION:  1. Left 





 basilar postsurgical scarring following CABG. 2. Mild cardiomegaly. 3. No 

acute 





 cardiopulmonary process. 4. 4 mm left lower lobe nodule 5. Cholelithiasis      





 Dane Tom MD 


 


Chest X-Ray 9/9/17 1506 Signed





Impressions: 





 Service Date/Time:  Saturday, September 9, 2017 15:27 - CONCLUSION:  1. 

Nodular 





 density right upper lung zone in region of first costochondral junction may 





 represent bony hypertrophy. Recommend followup PA and lateral views.  2. 

Minimal 





 blunting left costophrenic sulcus indicating small pleural effusion versus 





 scarring.    Carl Lopez MD 


 


Abdomen X-Ray 9/9/17 0000 Signed





Impressions: 





 Service Date/Time:  Saturday, September 9, 2017 20:12 - CONCLUSION:  

Nonspecific 





 gas pattern with moderate amount retained stool in the colon.  No evidence of 





 significant ileus or free air.     Dane Tom MD 








Current Medications








 Medications


  (Trade)  Dose


 Ordered  Sig/Meenakshi


 Route  Start Time


 Stop Time Status Last Admin


 


  (Nitrostat Sl)  0.4 mg  Q5M  PRN


 SL  9/9/17 18:30


     


 


 


  (Lipitor)  40 mg  HS


 PO  9/9/17 21:00


    9/12/17 20:54


 


 


  (Cardizem Cd)  240 mg  DAILY


 PO  9/10/17 09:00


    9/13/17 08:10


 


 


  (Imdur)  30 mg  DAILY@0700


 PO  9/10/17 07:00


    9/13/17 05:29


 


 


  (Synthroid)  75 mcg  DAILY@0600


 PO  9/10/17 06:00


    9/13/17 05:29


 


 


  (Theragran)  1 tab  DAILY


 PO  9/10/17 09:00


    9/13/17 08:12


 


 


  (Colace)  100 mg  BID


 PO  9/9/17 21:00


    9/13/17 08:11


 


 


  (Dulcolax Ec)  10 mg  DAILY


 PO  9/10/17 09:00


    9/12/17 08:18


 


 


  (Lactulose Liq)  30 ml  DAILY  PRN


 PO  9/9/17 20:00


    9/13/17 05:29


 


 


  (Zofran Inj)  4 mg  Q6HR  PRN


 IV PUSH  9/9/17 20:15


     


 


 


  (Aspirin Chew)  81 mg  DAILY


 CHEW  9/10/17 09:00


    9/13/17 08:11


 


 


  (Zofran Inj)  4 mg  Q4H  PRN


 IV PUSH  9/11/17 11:15


     


 


 


 Heparin Sodium/


 Dextrose  250 ml @ 


 8.856 mls/


 hr  TITRATE  PRN


 IV  9/11/17 14:30


   Future hold 9/13/17 18:13


 


 


  (Cordarone)  400 mg  BID


 PO  9/12/17 10:30


 9/16/17 21:01  9/13/17 08:11


 


 


  (Cordarone)  200 mg  DAILY


 PO  9/17/17 09:00


     


 


 


  (Restoril)  15 mg  HS  PRN


 PO  9/13/17 14:30


     


 











Assessment and Plan


Problem List:  


(1) Afib


ICD Codes:  I48.91 - Unspecified atrial fibrillation


Status:  Chronic


Plan:  In atrial fibrillation


HR high


EPS and ablation tomorrow AM


Case discussed with patient





(2) Chest pain


ICD Codes:  R07.9 - Chest pain, unspecified


Status:  Acute


Plan:  No chest pain reported





(3) CAD (coronary artery disease)


ICD Codes:  I25.10 - Atherosclerotic heart disease of native coronary artery 

without angina pectoris


Status:  Chronic











Bria Tariq MD Sep 13, 2017 18:19
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
## 2017-09-14 VITALS
SYSTOLIC BLOOD PRESSURE: 126 MMHG | TEMPERATURE: 97.4 F | HEART RATE: 77 BPM | OXYGEN SATURATION: 99 % | RESPIRATION RATE: 18 BRPM | DIASTOLIC BLOOD PRESSURE: 76 MMHG

## 2017-09-14 VITALS
HEART RATE: 88 BPM | TEMPERATURE: 98.1 F | DIASTOLIC BLOOD PRESSURE: 68 MMHG | SYSTOLIC BLOOD PRESSURE: 119 MMHG | OXYGEN SATURATION: 96 % | RESPIRATION RATE: 18 BRPM

## 2017-09-14 VITALS — HEART RATE: 85 BPM

## 2017-09-14 VITALS — HEART RATE: 87 BPM

## 2017-09-14 VITALS — HEART RATE: 74 BPM

## 2017-09-14 VITALS
OXYGEN SATURATION: 95 % | RESPIRATION RATE: 20 BRPM | SYSTOLIC BLOOD PRESSURE: 124 MMHG | HEART RATE: 92 BPM | TEMPERATURE: 98.4 F | DIASTOLIC BLOOD PRESSURE: 71 MMHG

## 2017-09-14 VITALS
HEART RATE: 88 BPM | SYSTOLIC BLOOD PRESSURE: 118 MMHG | TEMPERATURE: 97.9 F | RESPIRATION RATE: 18 BRPM | DIASTOLIC BLOOD PRESSURE: 69 MMHG | OXYGEN SATURATION: 96 %

## 2017-09-14 VITALS
OXYGEN SATURATION: 96 % | DIASTOLIC BLOOD PRESSURE: 82 MMHG | RESPIRATION RATE: 18 BRPM | HEART RATE: 88 BPM | SYSTOLIC BLOOD PRESSURE: 119 MMHG | TEMPERATURE: 98 F

## 2017-09-14 VITALS — HEART RATE: 76 BPM

## 2017-09-14 VITALS — HEART RATE: 73 BPM

## 2017-09-14 VITALS — HEART RATE: 78 BPM

## 2017-09-14 VITALS — HEART RATE: 86 BPM

## 2017-09-14 VITALS — HEART RATE: 75 BPM

## 2017-09-14 VITALS — HEART RATE: 83 BPM

## 2017-09-14 LAB — APTT BLD: 44.9 SEC (ref 24.3–30.1)

## 2017-09-14 PROCEDURE — 4A0234Z MEASUREMENT OF CARDIAC ELECTRICAL ACTIVITY, PERCUTANEOUS APPROACH: ICD-10-PCS | Performed by: INTERNAL MEDICINE

## 2017-09-14 PROCEDURE — B246ZZZ ULTRASONOGRAPHY OF RIGHT AND LEFT HEART: ICD-10-PCS | Performed by: INTERNAL MEDICINE

## 2017-09-14 PROCEDURE — 5A2204Z RESTORATION OF CARDIAC RHYTHM, SINGLE: ICD-10-PCS | Performed by: INTERNAL MEDICINE

## 2017-09-14 PROCEDURE — 02K83ZZ MAP CONDUCTION MECHANISM, PERCUTANEOUS APPROACH: ICD-10-PCS | Performed by: INTERNAL MEDICINE

## 2017-09-14 PROCEDURE — 02583ZZ DESTRUCTION OF CONDUCTION MECHANISM, PERCUTANEOUS APPROACH: ICD-10-PCS | Performed by: INTERNAL MEDICINE

## 2017-09-14 RX ADMIN — ACYCLOVIR SCH TAB: 800 TABLET ORAL at 14:08

## 2017-09-14 RX ADMIN — DABIGATRAN ETEXILATE MESYLATE SCH MG: 150 CAPSULE ORAL at 20:25

## 2017-09-14 RX ADMIN — ASPIRIN 81 MG SCH MG: 81 TABLET ORAL at 14:09

## 2017-09-14 RX ADMIN — AMIODARONE HYDROCHLORIDE SCH MG: 200 TABLET ORAL at 14:09

## 2017-09-14 RX ADMIN — ATORVASTATIN CALCIUM SCH MG: 40 TABLET, FILM COATED ORAL at 20:25

## 2017-09-14 RX ADMIN — DOCUSATE SODIUM SCH MG: 100 CAPSULE, LIQUID FILLED ORAL at 09:00

## 2017-09-14 RX ADMIN — DOCUSATE SODIUM SCH MG: 100 CAPSULE, LIQUID FILLED ORAL at 20:25

## 2017-09-14 RX ADMIN — LEVOTHYROXINE SODIUM SCH MCG: 75 TABLET ORAL at 06:13

## 2017-09-14 RX ADMIN — TEMAZEPAM PRN MG: 15 CAPSULE ORAL at 20:29

## 2017-09-14 RX ADMIN — AMIODARONE HYDROCHLORIDE SCH MG: 200 TABLET ORAL at 20:25

## 2017-09-14 RX ADMIN — ISOSORBIDE MONONITRATE SCH MG: 30 TABLET, EXTENDED RELEASE ORAL at 06:12

## 2017-09-14 RX ADMIN — BISACODYL SCH MG: 5 TABLET, COATED ORAL at 14:08

## 2017-09-14 NOTE — EKG
Date Performed: 09/14/2017       Time Performed: 11:24:33

 

PTAGE:      77 years

 

EKG:      JUNCTIONAL RHYTHM BORDERLINE LEFT AXIS DEVIATION MODERATE INTRAVENTRICULAR CONDUCTION DELAY
 NONSPECIFIC ST & T-WAVE ABNORMALITY SINCE PREVIOUS TRACING 9/10/2017, ST-T CHANGES INFERIORLY HAVE I
MPROVED , ST-T CHANGES ANTERIORLATERALY ARE SOMEWHAT WORSE. PREVIOUS TRACING READ AS INFERIOR INFARCT
-AGE UNDETERMINED CRITERIA NOT MET ON THIS TRACING. MA INTERVAL IS PROLONGED AT .26 ABNORMAL ECG

 

PREVIOUS TRACING       : 09/10/2017 03.34

 

DOCTOR:   Silvino Barbosa  Interpretating Date/Time  09/14/2017 17:37:30

## 2017-09-14 NOTE — PD.CARD
Atrial Fibrillation Ablation


PROCEDURE DATE:  Sep 14, 2017


PROCEDURES PERFORMED:


1. Electrophysiology study on Isuprel infusion


2. CS cannulation


3. 3-D mapping


4. Transseptal approach


5. Right and left heart catheterization


6. Intracardiac echo


7. Radiofrequency ablation of atrial fibrillation


8. Pulmonary vein isolation


9. Posterior wall ablation


10. Mitral valve isolation


11. Mitral line creation


12. Left atrial tachycardia ablation


13. Roof line creation


14. Floor line creation


15. Anterior and posterior ablation


16. Cardioversion





INDICATIONS FOR THE PROCEDURE


Mr. Wagner is a 77-year-old  male with hx of atrial fibrillation, 

previous ablation around 4 years ago, symptomatic, on anticoagulation, HR 

cannot be control with medications, referred for electrophysiology study and 

ablation. 


The risks, the nature and the benefits of the procedure were clearly stated to 

him.  The risks include pneumothorax, cardiac perforation, stroke, need for 

open heart surgery and even death.  The patient understood and agreed to 

proceed.





DESCRIPTION OF THE PROCEDURE IN DETAIL


As written informed consent was obtained prior to esophageal echocardiogram, 

the patient was kept on the table where he was prepped and draped in the usual 

sterile fashion.  Conscious sedation was initiated and maintained throughout 

the procedure by the anesthesiologist.  Once sedation was verified, the right 

and left inguinal areas were anesthetized with 2% Xylocaine.  Using modified 

Seldinger technique, the left femoral vein was cannulated on three occasions, 

three guidewires were advanced.  Over the wire a 6, 7 and a 10-[place] Hemaquet 

were advanced.  Then the left femoral artery was cannulated on one occasion, 

one guidewire was advanced.  Over the wire a 4-[place] Hemaquet was advanced.  

Then the right femoral vein was cannulated on one occasion, one guidewire was 

advanced.  Over the wire a 8-[place] Hemaquet was advanced.  Then under 

fluoroscopic guidance through the 6 and 7-[place] Hemaquet, two 5-[place] 

Kendra curved quadripolar electrophysiology catheters were advanced and 

placed around the His as well as coronary sinus.  Basic interval was measured.  

The patient was in atrial fibrillation.  Through the 10-[place] Hemaquet, a 

CordMySmartPriceter AcuNav intracardiac echo catheter was advanced and placed at the 

right atrium.  Multiple view was obtained.  There is no pericardial effusion, 

pulmonary vein was seen, atrial septal was visualized.  Then the 8-[place] 

Hemaquet in the right femoral vein was exchanged for Agilis transseptal sheath 

that was placed all the way to the superior vena cava.  Through the sheath a 

Jason needle was advanced, then the sheath, the dilator and the needle were 

progressed until foci engaged.  Once engaged, the needle was advanced.  RF was 

delivered for 2 seconds.  I was able to cross into the left atrium.  Once the 

needle crossed, the dilator was advanced.  Once the dilator crossed, the sheath 

was advanced.  Once the sheath crossed, the dilator and the needle were 

removed.  At this point I did flood the system and fluid movement was seen in 

the left atrium the indicates the sheath is in good position.  The patient 

already received 10,000 units of heparin.  The goal is to keep an ACT around 

350 during ablation.  Then through the sheath a St. Avel 20 pulse 

circumferential catheter was advanced.  Using [employer] endocardial solution 

mapping system, a two-dimensional configuration of the left atrium was 

obtained.  Points were taken at the left superior and inferior veins, right 

superior and inferior veins, mitral valve, and appendages.  Then through the 

sheath a St. Avel TactiCath 65cm 3.5mm irrigated tipped mapping and 

radiofrequency ablation catheter was advanced.  Esophageal probe was placed 

temperature monitoring during ablation.  When it increased to 0.5 degrees 

Celsius above baseline, I moved to a different area of the atrium.  First I did 

isolate the left superior vein. The left inferior vein was quiet.  I did make a 

big [place] around the veins.  Posterior wall  was ablated. Patient went to left 

atrial tachycardia.   Then a roof line was created. Early activation was 

anterior roof. Tachycardia CL prolonged  during ablation. A floor line was 

created, a mitral line was isolated, then the mitral valve was isolated.  





I did create a line from the floor to the roof area, passing by the left atrial 

appendage.  Then the right superior and inferior veins were isolated.  I did 

remap the atrium. Then posterior floor was ablated.  Tachycardia CL prolonged 

to 320 ms.  Further mapping was performed. Activation shifted. I decided to map 

the CS. Ablation was performed. CL prolonged but activation shifted again.


 Right atrial tach was mapped. There was a complete dissociation between the 

right and left atrium.    There is no significant signal in the left  atrium.  

At this point I decided to proceed with cardioversion.  A 200 sync biphasic 

joule was delivered that converted the patient into sinus rhythm. There was no 

signal into the vein, pacing from the vein showed no conduction to the atrium.  

Isuprel infusion was initiated at 20 mcg for 10 minutes.  No tachyarrhythmia 

was induced, post Isuprel no tachyarrhythmia was induced.  At that point the 

procedure was complete.  All catheters were removed, atrial septal sheath was 

exchanged for 9-[place] Hemaquet, intracardiac echo showed no pericardial 

effusion.  There is still good flow in the pulmonary veins.  The patient is 

going to be transferred to the recovery room.  No incident report.  The patient 

tolerated the procedure.  Blood loss was minimal.





FINDINGS


1. Electrocardiogram: At baseline the patient was in atrial fibrillation, post 

procedure the patient was in sinus rhythm.


2. Basic interval: Base cycle length was around 520.  Post ablation she was 

around 860 milliseconds.  AH at 100 and HV at 60 milliseconds.


3. Tachyarrhythmia: Atrial fibrillation was mapped and ablated.  Atrial 

tachycardia was ablated.  The ablation was successful.





CONCLUSION


Successful electrophysiology study, mapping, radiofrequency ablation of atrial 

fibrillation, left atrial tachycardia, pulmonary vein isolation, posterior 

ablation, mitral valve isolation, mitral line creation, roof line creation, 

floor line creation, left atrial tachycardia, and cardioversion.





COMMENTS AND RECOMMENDATIONS


The patient is going to be transferred to the telemetry unit.  Will be observed 

and when stable can be discharged home.











Bria Tariq MD Sep 14, 2017 14:49
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
## 2017-09-15 VITALS
TEMPERATURE: 98.1 F | OXYGEN SATURATION: 96 % | HEART RATE: 76 BPM | SYSTOLIC BLOOD PRESSURE: 146 MMHG | RESPIRATION RATE: 18 BRPM | DIASTOLIC BLOOD PRESSURE: 89 MMHG

## 2017-09-15 VITALS
DIASTOLIC BLOOD PRESSURE: 67 MMHG | OXYGEN SATURATION: 94 % | SYSTOLIC BLOOD PRESSURE: 113 MMHG | HEART RATE: 68 BPM | TEMPERATURE: 97.7 F | RESPIRATION RATE: 16 BRPM

## 2017-09-15 VITALS
RESPIRATION RATE: 18 BRPM | DIASTOLIC BLOOD PRESSURE: 78 MMHG | OXYGEN SATURATION: 96 % | TEMPERATURE: 97.9 F | SYSTOLIC BLOOD PRESSURE: 138 MMHG | HEART RATE: 71 BPM

## 2017-09-15 VITALS — HEART RATE: 76 BPM

## 2017-09-15 VITALS
TEMPERATURE: 97.2 F | SYSTOLIC BLOOD PRESSURE: 119 MMHG | HEART RATE: 76 BPM | DIASTOLIC BLOOD PRESSURE: 71 MMHG | RESPIRATION RATE: 20 BRPM | OXYGEN SATURATION: 95 %

## 2017-09-15 VITALS — HEART RATE: 73 BPM

## 2017-09-15 VITALS — HEART RATE: 68 BPM

## 2017-09-15 VITALS — HEART RATE: 78 BPM

## 2017-09-15 VITALS — HEART RATE: 74 BPM

## 2017-09-15 VITALS — OXYGEN SATURATION: 97 %

## 2017-09-15 LAB
APTT BLD: 33.1 SEC (ref 24.3–30.1)
ERYTHROCYTE [DISTWIDTH] IN BLOOD BY AUTOMATED COUNT: 15.3 % (ref 11.6–17.2)
HCT VFR BLD CALC: 42.5 % (ref 39–51)
INR PPP: 1.1 RATIO
MCH RBC QN AUTO: 31 PG (ref 27–34)
MCHC RBC AUTO-ENTMCNC: 34.3 % (ref 32–36)
MCV RBC AUTO: 90.2 FL (ref 80–100)
PLATELET # BLD: 158 TH/MM3 (ref 150–450)
PROTHROMBIN TIME: 11.8 SEC (ref 9.8–11.6)
RBC # BLD AUTO: 4.71 MIL/MM3 (ref 4.5–5.9)
REVIEW FLAG: (no result)
WBC # BLD AUTO: 9.9 TH/MM3 (ref 4–11)

## 2017-09-15 RX ADMIN — DOCUSATE SODIUM SCH MG: 100 CAPSULE, LIQUID FILLED ORAL at 08:19

## 2017-09-15 RX ADMIN — LEVOTHYROXINE SODIUM SCH MCG: 75 TABLET ORAL at 06:00

## 2017-09-15 RX ADMIN — DABIGATRAN ETEXILATE MESYLATE SCH MG: 150 CAPSULE ORAL at 08:18

## 2017-09-15 RX ADMIN — BISACODYL SCH MG: 5 TABLET, COATED ORAL at 08:20

## 2017-09-15 RX ADMIN — ASPIRIN 81 MG SCH MG: 81 TABLET ORAL at 08:20

## 2017-09-15 RX ADMIN — ISOSORBIDE MONONITRATE SCH MG: 30 TABLET, EXTENDED RELEASE ORAL at 07:00

## 2017-09-15 RX ADMIN — ACYCLOVIR SCH TAB: 800 TABLET ORAL at 08:17

## 2017-09-15 RX ADMIN — AMIODARONE HYDROCHLORIDE SCH MG: 200 TABLET ORAL at 08:19

## 2017-09-15 NOTE — EKG
Date Performed: 09/15/2017       Time Performed: 06:02:00

 

PTAGE:      77 years

 

EKG:      Sinus rhythm 

 

 with PVC(s) with 1st degree A-V block Consider left atrial abnormality Leftward axis Lateral ST-T ch
anges are nonspecific Abnormal ECG Compared to prior electrocardiogram, Premature ventricular contrac
tions are now present and Nonspecific ST and T wave abnormalities are less marked 

 

 PREVIOUS TRACING            : 09/14/2017 11.24

 

DOCTOR:   Terence Salazar  Interpretating Date/Time  09/15/2017 13:45:25
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
## 2017-09-18 NOTE — CATHPROC
[facility] HIS Report

Study Information

Study Number   Scheduled Start         Study Start

 

[id number]   09/14/2017            Sep 14 2017 6:44AM

 

Referring Institution          Admit Source                 Facility Department

 

1                    Other                     [facility] - Cath Lab

 

Physician and Clinical Staff

Initial Bria Blunt     Circulator        Brandon Morales,RT(R)

 

                    Circulator        Ilana Yanez,RRT TECH2

 

                    Other          Anesthesia, CRNA

 

                    Recorder         Olive Kirby,RN

 

                    Recorder         Skinny BARBOSA, Funmi [place] RCIS

 

Procedures Performed

Procedure                Location (Site)               Vessel Name

 

Ablation Procedure

Cardioversion

ICE CATHETER INSERT           RA                     Atruim

RF Ablation               Coronary Sinus               Other

RF Ablation               LT. ATRIUM                 LT. ATRIUM

Equipment

Time         Description          Size   Mfg Part Number  Used/Scraped

                 NEEDLE, TRANSSEPTAL NRG 98

06:52   [facility]                         [id number]  Used

                 C1

     [employer]/ EP

06:52              KIT, TRANSDUCER / AFIB          [id number]      Used

     PACER

                                     [id number]

                 CATHETER, TACTICATH ABLAT       BUNDLE

06:52   [employer]                                 Used

                 65 BUNDLE               *[id number]-

                                     BUNDLE

                                     [id number]

                 CATHETER, FR7 OPTIMA SPIRAL

06:52   BUND[employer]                    FR7   *[id number]     Used

                 BUNDLE

                                     BUNDLE

                                     [id number]

06:52   BUN[employer]     CATHETER, JSN, QUAD BUNDLE  FR 5   *[id number]-     Used

                                     BUNDLE

                                     [id number]

06:52   [employer]     CATHETER, JSN, QUAD BUNDLE  FR 5   *[id number]-     Used

                                     BUNDLE

                                     [id number]

                 SET, COOL POINT TUBING

06:52   [employer]UN[employer]                        *[id number]     Used

                 BUNDLE

                                     BUNDLE

                 SHEATH, FR8.5 STEERABLE SM

06:52   BUND[employer]                    71CM   [id number]   Used

                 71CM BUNDLE

                 COVER, TRANSDUCER CABLE

06:52   [employer]                        [id number]      Used

                 ACUNAV

06:52   CORDIS/PACER      SHEATH, FR10 THAD 11CM   FR 10  [id number]     Used

 

10:00   CORDIS/PACER      SHEATH, FR10 THAD 11CM   FR 10  [id number]     Used

 

06:52   CORDIS/PACER      SHEATH, FR9 THAD 11CM    FR 9   [id number]     Used

                                     [id number]

06:52   [employer]   PACK, CCL CUSTOM       *             Used

                                     *[id number]

06:52   [employer]      [place], LIMB         *    3140 *[id number]   Used

                                     [id number]

06:52   [facility]      SHEATH, FR4.5 PRELUDE 11CM  FR 4.5           Used

                                     [id number]

                                     [id number]

06:52   NAMIC          TUBING, HIGH PRESSURE 48"   48"            Used

                                     *[id number]

                                     [id number]

06:52   NAMIC          TUBING, HIGH PRESSURE 48"   48"            Used

                                     *[id number]

                                     [id number]

06:52   [employer]      BLANKET,WARM AIR CCL     *             Used

                                     *[id number]

06:52   [facility]    ELECTRODE KIT, WANDA X SURFACE *     [id number]     Used

                                     [id number]

06:52   [facility]    SHEATH, EPS, FR6 FAST CATH  FR 6            Used

                                     *[id number]

06:52   [facility]    SHEATH, EPS, FR7 FAST CATH  FR 7   [id number]      Used

                                     [id number]

06:52   [facility]    SHEATH, EPS, FR8 FAST CATH  FR 8            Used

                                     *[id number]

                 CATHETER, ACUNAV FR10 ICE       [id number]

08:02   [employer]                        FR 10           Used

                 (ISIDORO)               *[id number]

     [facility]      PAD, ELECTROSURGICAL

06:52                              *     *[id number] Used

     SURGICAL        GROUNDING (BLUE)

 

History: Allergies

Allergy            Reaction

 

latex             rash

History: Risk Factors

Dyslipidemia

 

Yes

 

Prior CABG

 

Yes

 

 

 

 

Labs

Hgb (g/dl)       Hct (%)         WBC (l/cumm)        Platelets (thousands)

 

11.60-17.00      35.00-51.00       4.00-11.00         150..00

 

13.0          39           4.4            157

 

Glucose (mg/dl)    BUN (mg/dl)       Creatinine (mg/dl)   BUN:Creatinine (1:x)

74..00      7.00-18.00       0.50-1.30        10.00-20.00

 

91           11           1.0           11

 

Na (meq/l)       K (meq/l)

 

136..00     3.50-5.10

 

139          3.7

 

INR (PTT:PT)

 

0.90-1.10

 

1

 

CPK-MB (ng/ML)

 

0.50-3.60

 

Not Drawn

 

 

 

 

Medication

Medication Total Dose (Bolus/Oral)

Medication             Total Dosage/Unit

 

1% XYLOCAINE               40 mL

 

PROTAMINE                 50 mg

 

Medications (Bolus/Oral)

Medication          Time Given         Dosage/Unit      Administered By     Reason

 

1% XYLOCAINE         9/14/2017 7:54:00 AM     20 mL        Bria Tariq

Patient arrived on 20 mL 1% XYLOCAINE given by Bria Tariq in Left Groin via Subcutaneous. Ordered 
by Bria Tariq.

 

1% XYLOCAINE         9/14/2017 7:58:56 AM     20 mL        Bria Tariq

Patient arrived on 20 mL 1% XYLOCAINE given by Bria Tariq in Right Groin via Subcutaneous. Ordered
 by Bria Tariq.

 

PROTAMINE          9/14/2017 10:00:00 AM     40 mg        Anesthesia, CRNA     As per physicians verb
al order

40 mg PROTAMINE given in lab by Anesthesia, CRNA via Peripheral IV. Ordered by Bria Tariq. Reason:
 As per physicians verbal order.

 

PROTAMINE          9/14/2017 10:10:41 AM     10 mg        Anesthesia, CRNA     As per physicians verb
al order

10 mg PROTAMINE given in lab by Anesthesia, CRNA via Peripheral IV. Ordered by Bria Tariq. Reason:
 As per physicians verbal order.

Medication (Drip)

Medication          Time Given          Dosage/Unit      Concentration/Unit  Diluent (ml)  Solution

 

HEPARIN DRIP         9/14/2017 7:01:34 AM     800 units/hr       08132 units    250       D5W

Patient arrived on 800 units/hr HEPARIN DRIP in Left Forearm via Peripheral IV. Pump/Drip Flow = 8 ml
/hr using D5W [place] 8[address]

units in 250 ml.

ISUPREL           9/14/2017 9:46:29 AM      10 mcg/min          1 mg     250       NaCl .9

10 mcg/min ISUPREL given by Anesthesia, CRNA via Peripheral IV. Pump/Drip Flow = 150 ml/hr using NaCl
 .9 with a concentration of 1 mg in 250

ml. Ordered by Bria Tariq.

LEVAQUIN           9/14/2017 7:52:28 AM     100 mL/hr         500       100       NaCl .9

Patient arrived on 100 mL/hr LEVAQUIN given by Anesthesia, CRNA via Peripheral IV. Pump/Drip Flow = 0
 ml/hr using NaCl .9 with a concentration of

500 in 100 ml. Ordered by Bria Tariq. Reason: As per physicians verbal order. for virgie

 

Initial Case Assessment

Cardiovascular

HR             NIBP            Chest Pain

 

95             131/81           0

 

Edema Present       Skin color             Skin

 

None            Normal               Warm Dry

 

Circulatory - Right Pulses

Dorsalis Pedis

 

1

 

Scale (0,1,2,3,4,d)

 

Circulatory - Left Pulses

Dorsalis Pedis

 

1

 

Scale (0,1,2,3,4,d)

 

Neurological State

              Oriented to time-place-

Alert                       Moves all extremities

              person

 

Respiration - General

Respiration Rate

              SpO2 (%)

(B/min)

15             96

Final Case Assessment

Cardiovascular

HR           Rhythm          NIBP          Chest Pain

 

63           sr            100/61         0

 

Edema Present      Skin color           Skin

 

None           Normal             Warm Dry

 

Circulatory - Right Pulses

Dorsalis Pedis

 

1

 

Scale (0,1,2,3,4,d)

 

Circulatory - Left Pulses

Dorsalis Pedis

 

1

 

Scale (0,1,2,3,4,d)

 

Circulatory - Lower Extremities

Color Lower Right    Color Lower Left

 

 

Normal         Normal

 

Neurological State

Drowsy

 

Respiration - General

Respiration Rate

            SpO2 (%)

(B/min)

16           100

 

Respiration - Ventilator Type

Intubation Type

 

ET(oral)

 

Chronological Log

Time    Study Chronological Log

 

7:00:38   Patient arrived via Bed.

 

7:00:39   Patient Name, D.O.B, / Armband Verified By R.N.

 

7:00:39   Consent signed by the physician and the patient and verified by the Cath Lab staff.

 

7:00:40   Pre-op and post- op instructions given; patient acknowledges understanding of instructions.


 

7:00:45   Verbal Stimulation=2 Physical Stimulation=2 Airway=2 Respiration=2 TOTAL=8. (0=absent, 1=li
mited, 2=present)

 

7:00:58   Anesthesia at bedside. Assumes care of patient.

 

7:01:02   Patient has been NPO for More than 6Hrs.

 

7:01:03   Skin Breakdown-right anterior calf w 2 bandaids, generalized bruising

 

7:01:27   Patient Warmer Placed on the Table.

 

7:01:28   Disposable Defibrillator Pads Placed On Patient.

 

7:01:29   Ana Maria Prominences Protected

7:01:31  A # 20 IV was noted in the Forearm (left). Grade = 0

 

7:01:31  A # 20 IV was noted in the Antecubital (right). Grade = 0

 

7:01:33  A # 20 IV was noted in the Antecubital (left). Grade = 0

     Patient arrived on 800 units/hr HEPARIN DRIP in Left Forearm via Peripheral IV. Pump/Drip Flow =
 8 ml/hr using D5W

7:01:34

     [place] [address] units in 250 ml.

7:01:37  History and physical on the chart or being dictated.

 

7:11:32  Table restraints applied according to hospital policy

     Assessment: Initial Case, HR=95 BPM, DTLX=626/81 mmhg, Chest Pain=0, Edema=None, Color=Normal, S
kin =

     Warm, Dry

     Right Pulses: Jean Carlos Ped=1

7:24:17

     Left Pulses: Jean Carlos Ped=1

     Neurological: State=Alert, Ox3, ZELAYA

     Respiration: Resp=15 B/min, SpO2=96 %

7:25:43  Right groin prepped with 2% chlorhexidine, and with a 3 min. waiting time.

 

7:25:50  Left groin prepped with 2% chlorhexidine, and with a 3 min. waiting time.

 

7:35:28  MD paged

 

7:37:06  MD responded

 

7:45:57  MD arrived.

 

7:46:13  Reference ECG taken

     Time Out. Correct patient, procedure, procedure equipment, site and side verified with physician
 present. Time

7:51:01

     concurred by MD, individual staff and CRNA.

     Time Out #2 - Consents verified, patient in correct position, all results are labled and display
ed, safety precautions

7:51:19

     taken, antibiotics administered. Time out concurred by MD, individual staff and CRNA in procedur
e

7:51:33  Case Start

 

7:51:53  Jeo in progress

     Patient arrived on 100 mL/hr LEVAQUIN given by Anesthesia, CRNA via Peripheral IV. Pump/Drip Te
w = 0 ml/hr using

7:52:28  NaCl .9 with a concentration of 500 in 100 ml. Ordered by Bria Tariq. Reason: As per phys
icians verbal order. for

     virgie

7:53:53  Joe complete

     Patient arrived on 20 mL 1% XYLOCAINE given by Bria Tariq in Left Groin via Subcutaneous. Ord
ered by Chandni,

7:54:00

     Bria.

7:54:28  Vascular access was obtained in the Fem Vein (left).

 

7:54:31  Vascular access was obtained in the Fem Vein (left).

 

7:54:45  Vascular access was obtained in the Fem Vein (left).

 

7:54:48  Vascular access was obtained in the Fem Art (left).

     A SHEATH, FR4.5 PRELUDE 11CM FR 4.5 was advanced into the Fem Art (left) using the Percutaneous 
technique.

7:54:59

     0.9ns pressure bag connected.

7:55:51  A SHEATH, EPS, FR6 FAST CATH FR 6 was advanced into the Fem Vein (left) using the Percutaneo
us technique.

 

7:55:59  A SHEATH, EPS, FR7 FAST CATH FR 7 was advanced into the Fem Vein (left) using the Percutaneo
us technique.

 

7:56:03  A SHEATH, FR10 THAD 11CM FR 10 was advanced into the Fem Vein (left) using the Percutaneou
s technique.

     Patient arrived on 20 mL 1% XYLOCAINE given by Bria Tariq in Right Groin via Subcutaneous. Or
dered by Chandni,

7:58:56

     Bria.

7:59:07  Vascular access was obtained in the Fem Vein (right).

 

7:59:12  A SHEATH, EPS, FR8 FAST CATH FR 8 was advanced into the Fem Art (right) using the Percutaneo
us technique.

     A SHEATH, FR8.5 STEERABLE SM 71CM BUNDLE 71CM was exchanged in the Fem Vein (right). This was ne
cessary in

7:59:38

     order for catheter support.

8:00:00  CATHETER, ACUNAV FR10 ICE ([employer]) FR 10 Was Postioned.

     A CATHETER, JSN, QUAD BUNDLE FR 5 was advanced vis Fem Vein (left) and placed in the CS. Placeme
nt was visually

8:01:18

     confirmed under fluoroscopy.

      A CATHETER, JSN, QUAD BUNDLE FR 5 was advanced vis Fem Vein (left) and placed in the HIS. Place
ment was

8:01:37

      visually confirmed under fluoroscopy.

      A CATHETER, JSN, QUAD BUNDLE FR 5 was advanced vis Fem Vein (left) and placed in the HIS. Place
ment was

8:01:44

      visually confirmed under fluoroscopy.

8:04:58  EPS in progress

 

8:06:59  [place] in

 

8:10:13  Activated Clotting Time Drawn

 

8:10:19  A eps was advanced to the right atrium and passed through the septal wall to the left atrium
.

 

8:10:27  [place] out

 

8:12:40  ACT (Normal Range ) = 167

      A CATHETER, FR7 OPTIMA SPIRAL BUNDLE FR7 was advanced vis Fem Vein (left) and placed in the LA.
 Placement

8:12:55

      was visually confirmed under fluoroscopy.

8:19:27  Catheter was removed

 

8:21:30  Activated Clotting Time Drawn

      A CATHETER, TACTICATH ABLAT 65 BUNDLE was advanced vis Fem Vein (right) and placed in the LA. P
lacement was

8:23:06

      visually confirmed under fluoroscopy.

8:27:22  RF Ablation of the LT. ATRIUM with a CATHETER,  TACTICATH ABLAT 65 BUNDLE.

 

8:30:52  ACT (Normal Range ) = 288

 

8:37:02  Activated Clotting Time Drawn

 

8:44:26  ACT (Normal Range ) = 303

 

8:53:29  Activated Clotting Time Drawn

 

8:58:02  ACT (Normal Range ) = 318

 

9:06:00  Activated Clotting Time Drawn

 

9:12:09  ACT (Normal Range ) = 378

 

9:17:38  RF Ablation of the LT. ATRIUM with a CATHETER,  TACTICATH ABLAT 65 BUNDLE. Continues

 

9:42:47  RF Ablation of the Coronary Sinus with a CATHETER,  TACTICATH ABLAT 65 BUNDLE.

 

9:44:17  ECG rhythm of ~RHYTHMS~ noted. Patient cardioverted at 200 joules. Success.    AT

      10 mcg/min ISUPREL given by Anesthesia, CRNA via Peripheral IV. Pump/Drip Flow = 150 ml/hr usin
g NaCl .9 with a

9:46:29

      concentration of 1 mg in 250 ml. Ordered by Bria Tariq.

9:55:00  Isuprel off

      A SHEATH, FR9 THAD 11CM FR 9 was exchanged in the Fem Vein (right). This was necessary in ord
er to achieve

9:55:28

      vascular hemostasis.

      40 mg PROTAMINE given in lab by Anesthesia, CRNA via Peripheral IV. Ordered by Bria Tariq. SETH ann: As per

10:00:00

      physicians verbal order.

10:00:25  Catheter(s) removed without difficulty

 

10:08:04  Activated Clotting Time Drawn

 

10:09:31  Ablation procedure performed: AFIB.

 

10:09:39  EP Procedure was performed.

 

10:10:00  ACT (Normal Range ) = 190

      10 mg PROTAMINE given in lab by Anesthesia, CRNA via Peripheral IV. Ordered by Rut Tariq: As per

10:10:41

      physicians verbal order.

10:16:30  Activated Clotting Time Drawn

 

10:18:09  ACT (Normal Range ) = 159

 

10:19:17  Sheaths removed; pressure applied to access sites.

 

10:35:55  PACU called. Spoke to Nando

10:36:08   Bedside Report will be given.

 

10:42:20   Case End

 

10:42:31   No case complications noted.

 

10:42:32   Cine recording checked.

 

10:45:48   Defibrillator and ground pads removed. Skin intact.

       Assessment: Final Case, HR=63 BPM, Rhythm=sr, GKEA=049/61 mmhg, Chest Pain=0, Edema=None, [place]
r=Normal,

       Skin = Warm, Dry

       Right Pulses: Jean Carlos Ped=1

       Left Pulses: Jean Carlos Ped=1

10:47:25

       Lower Right Extremities: Color=Normal

       Lower Left Extremities: Color=Normal

       Neurological: State=Drowsy

       Respiration: Resp=16 B/min, WqC1=344 %, Type=ET(Oral)

10:53:02   Sterile dressings applied to sites

 

11:00:00   Patient moved to stretcher

 

 

 

 

End Study - Contrast Media Used In Study

Contrast        Total Opened (mL)      Total Used (mL)     Total Wasted (mL)

 

Unspecified      0              0            0

 

End Study - Maximum Contrast Load

Max Contrast Load (mL)

 

367.5

 

End Study - Radiation Exposure

Fluoro Time

(minutes)

3.9

 

 

End Study - Patient Disposition

Complications     Transferred To           Interventional Outcome

 

No           Telemetry Bed           successful

## 2017-09-28 ENCOUNTER — HOSPITAL ENCOUNTER (INPATIENT)
Dept: HOSPITAL 17 - NEPE | Age: 77
LOS: 2 days | Discharge: NURSING, REHAB OR LONG-TERM CARE | DRG: 698 | End: 2017-09-30
Payer: MEDICARE

## 2017-09-28 VITALS — SYSTOLIC BLOOD PRESSURE: 157 MMHG | TEMPERATURE: 97.8 F | DIASTOLIC BLOOD PRESSURE: 73 MMHG

## 2017-09-28 VITALS — HEART RATE: 54 BPM

## 2017-09-28 VITALS
HEART RATE: 53 BPM | SYSTOLIC BLOOD PRESSURE: 159 MMHG | TEMPERATURE: 98.2 F | RESPIRATION RATE: 18 BRPM | DIASTOLIC BLOOD PRESSURE: 85 MMHG | OXYGEN SATURATION: 99 %

## 2017-09-28 VITALS
RESPIRATION RATE: 16 BRPM | HEART RATE: 49 BPM | DIASTOLIC BLOOD PRESSURE: 84 MMHG | SYSTOLIC BLOOD PRESSURE: 141 MMHG | TEMPERATURE: 97.6 F | OXYGEN SATURATION: 98 %

## 2017-09-28 VITALS — WEIGHT: 170.86 LBS | BODY MASS INDEX: 21.93 KG/M2 | HEIGHT: 74 IN

## 2017-09-28 VITALS
TEMPERATURE: 98.2 F | OXYGEN SATURATION: 98 % | HEART RATE: 47 BPM | RESPIRATION RATE: 16 BRPM | SYSTOLIC BLOOD PRESSURE: 123 MMHG | DIASTOLIC BLOOD PRESSURE: 67 MMHG

## 2017-09-28 VITALS — HEART RATE: 52 BPM

## 2017-09-28 VITALS
OXYGEN SATURATION: 96 % | SYSTOLIC BLOOD PRESSURE: 142 MMHG | RESPIRATION RATE: 18 BRPM | DIASTOLIC BLOOD PRESSURE: 77 MMHG | TEMPERATURE: 97.8 F | HEART RATE: 52 BPM

## 2017-09-28 VITALS
TEMPERATURE: 97.9 F | SYSTOLIC BLOOD PRESSURE: 149 MMHG | RESPIRATION RATE: 20 BRPM | HEART RATE: 55 BPM | DIASTOLIC BLOOD PRESSURE: 83 MMHG | OXYGEN SATURATION: 98 %

## 2017-09-28 VITALS
DIASTOLIC BLOOD PRESSURE: 55 MMHG | SYSTOLIC BLOOD PRESSURE: 136 MMHG | RESPIRATION RATE: 18 BRPM | HEART RATE: 52 BPM | OXYGEN SATURATION: 98 %

## 2017-09-28 VITALS
SYSTOLIC BLOOD PRESSURE: 146 MMHG | DIASTOLIC BLOOD PRESSURE: 67 MMHG | HEART RATE: 54 BPM | RESPIRATION RATE: 20 BRPM | TEMPERATURE: 97.5 F | OXYGEN SATURATION: 97 %

## 2017-09-28 VITALS — HEART RATE: 56 BPM

## 2017-09-28 VITALS — HEART RATE: 47 BPM

## 2017-09-28 VITALS — HEART RATE: 63 BPM

## 2017-09-28 VITALS — HEART RATE: 50 BPM

## 2017-09-28 VITALS — HEART RATE: 51 BPM

## 2017-09-28 DIAGNOSIS — I48.0: ICD-10-CM

## 2017-09-28 DIAGNOSIS — I34.0: ICD-10-CM

## 2017-09-28 DIAGNOSIS — D64.9: ICD-10-CM

## 2017-09-28 DIAGNOSIS — I13.0: ICD-10-CM

## 2017-09-28 DIAGNOSIS — E87.2: ICD-10-CM

## 2017-09-28 DIAGNOSIS — N40.0: ICD-10-CM

## 2017-09-28 DIAGNOSIS — T83.511A: Primary | ICD-10-CM

## 2017-09-28 DIAGNOSIS — I27.2: ICD-10-CM

## 2017-09-28 DIAGNOSIS — R00.1: ICD-10-CM

## 2017-09-28 DIAGNOSIS — Z95.1: ICD-10-CM

## 2017-09-28 DIAGNOSIS — E78.5: ICD-10-CM

## 2017-09-28 DIAGNOSIS — Z87.891: ICD-10-CM

## 2017-09-28 DIAGNOSIS — I25.10: ICD-10-CM

## 2017-09-28 DIAGNOSIS — E03.9: ICD-10-CM

## 2017-09-28 DIAGNOSIS — Z79.01: ICD-10-CM

## 2017-09-28 DIAGNOSIS — I50.9: ICD-10-CM

## 2017-09-28 DIAGNOSIS — I25.2: ICD-10-CM

## 2017-09-28 DIAGNOSIS — R53.81: ICD-10-CM

## 2017-09-28 DIAGNOSIS — N18.2: ICD-10-CM

## 2017-09-28 DIAGNOSIS — A41.9: ICD-10-CM

## 2017-09-28 DIAGNOSIS — E86.0: ICD-10-CM

## 2017-09-28 DIAGNOSIS — N39.0: ICD-10-CM

## 2017-09-28 DIAGNOSIS — K57.90: ICD-10-CM

## 2017-09-28 DIAGNOSIS — R91.1: ICD-10-CM

## 2017-09-28 DIAGNOSIS — I42.9: ICD-10-CM

## 2017-09-28 DIAGNOSIS — G93.41: ICD-10-CM

## 2017-09-28 DIAGNOSIS — Z95.5: ICD-10-CM
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
## 2017-09-28 LAB
ALP SERPL-CCNC: 79 U/L (ref 45–117)
ALT SERPL-CCNC: 26 U/L (ref 12–78)
ANION GAP SERPL CALC-SCNC: 11 MEQ/L (ref 5–15)
ANION GAP SERPL CALC-SCNC: 4 MEQ/L (ref 5–15)
APTT BLD: 35.6 SEC (ref 24.3–30.1)
AST SERPL-CCNC: 19 U/L (ref 15–37)
BACTERIA #/AREA URNS HPF: (no result) /HPF
BASOPHILS # BLD AUTO: 0 TH/MM3 (ref 0–0.2)
BASOPHILS NFR BLD: 0.2 % (ref 0–2)
BILIRUB SERPL-MCNC: 1.1 MG/DL (ref 0.2–1)
BUN SERPL-MCNC: 16 MG/DL (ref 7–18)
BUN SERPL-MCNC: 20 MG/DL (ref 7–18)
CHLORIDE SERPL-SCNC: 102 MEQ/L (ref 98–107)
CHLORIDE SERPL-SCNC: 98 MEQ/L (ref 98–107)
COLOR UR: (no result)
COMMENT (UR): (no result)
CULTURE IF INDICATED: (no result)
EOSINOPHIL # BLD: 0 TH/MM3 (ref 0–0.4)
EOSINOPHIL NFR BLD: 0.3 % (ref 0–4)
ERYTHROCYTE [DISTWIDTH] IN BLOOD BY AUTOMATED COUNT: 15 % (ref 11.6–17.2)
GFR SERPLBLD BASED ON 1.73 SQ M-ARVRAT: 52 ML/MIN (ref 89–?)
GFR SERPLBLD BASED ON 1.73 SQ M-ARVRAT: 70 ML/MIN (ref 89–?)
GLUCOSE UR STRIP-MCNC: (no result) MG/DL
HCO3 BLD-SCNC: 24.2 MEQ/L (ref 21–32)
HCO3 BLD-SCNC: 30 MEQ/L (ref 21–32)
HCT VFR BLD CALC: 40.7 % (ref 39–51)
HEMO FLAGS: (no result)
HGB UR QL STRIP: (no result)
HYALINE CASTS #/AREA URNS LPF: 5 /LPF
INR PPP: 1.3 RATIO
KETONES UR STRIP-MCNC: (no result) MG/DL
LACTIC ACID GHOST: (no result)
LYMPHOCYTES # BLD AUTO: 0.9 TH/MM3 (ref 1–4.8)
LYMPHOCYTES NFR BLD AUTO: 5.6 % (ref 9–44)
MCH RBC QN AUTO: 30.6 PG (ref 27–34)
MCHC RBC AUTO-ENTMCNC: 34.1 % (ref 32–36)
MCV RBC AUTO: 89.7 FL (ref 80–100)
MONOCYTES NFR BLD: 3.6 % (ref 0–8)
MUCOUS THREADS #/AREA URNS LPF: (no result) /LPF
NEUTROPHILS # BLD AUTO: 14.4 TH/MM3 (ref 1.8–7.7)
NEUTROPHILS NFR BLD AUTO: 90.3 % (ref 16–70)
NITRITE UR QL STRIP: (no result)
PLATELET # BLD: 259 TH/MM3 (ref 150–450)
POTASSIUM SERPL-SCNC: 4.1 MEQ/L (ref 3.5–5.1)
POTASSIUM SERPL-SCNC: 5.2 MEQ/L (ref 3.5–5.1)
PROTHROMBIN TIME: 14.2 SEC (ref 9.8–11.6)
RBC # BLD AUTO: 4.54 MIL/MM3 (ref 4.5–5.9)
SODIUM SERPL-SCNC: 133 MEQ/L (ref 136–145)
SODIUM SERPL-SCNC: 136 MEQ/L (ref 136–145)
SP GR UR STRIP: 1.02 (ref 1–1.03)
WBC # BLD AUTO: 15.9 TH/MM3 (ref 4–11)

## 2017-09-28 PROCEDURE — 93005 ELECTROCARDIOGRAM TRACING: CPT

## 2017-09-28 PROCEDURE — 85610 PROTHROMBIN TIME: CPT

## 2017-09-28 PROCEDURE — 96365 THER/PROPH/DIAG IV INF INIT: CPT

## 2017-09-28 PROCEDURE — 85025 COMPLETE CBC W/AUTO DIFF WBC: CPT

## 2017-09-28 PROCEDURE — 82140 ASSAY OF AMMONIA: CPT

## 2017-09-28 PROCEDURE — 84484 ASSAY OF TROPONIN QUANT: CPT

## 2017-09-28 PROCEDURE — 80162 ASSAY OF DIGOXIN TOTAL: CPT

## 2017-09-28 PROCEDURE — 87086 URINE CULTURE/COLONY COUNT: CPT

## 2017-09-28 PROCEDURE — 80048 BASIC METABOLIC PNL TOTAL CA: CPT

## 2017-09-28 PROCEDURE — 80053 COMPREHEN METABOLIC PANEL: CPT

## 2017-09-28 PROCEDURE — 83605 ASSAY OF LACTIC ACID: CPT

## 2017-09-28 PROCEDURE — 85730 THROMBOPLASTIN TIME PARTIAL: CPT

## 2017-09-28 PROCEDURE — 70450 CT HEAD/BRAIN W/O DYE: CPT

## 2017-09-28 PROCEDURE — 71010: CPT

## 2017-09-28 PROCEDURE — 84443 ASSAY THYROID STIM HORMONE: CPT

## 2017-09-28 PROCEDURE — 81001 URINALYSIS AUTO W/SCOPE: CPT

## 2017-09-28 RX ADMIN — DABIGATRAN ETEXILATE MESYLATE SCH MG: 150 CAPSULE ORAL at 09:19

## 2017-09-28 RX ADMIN — AMIODARONE HYDROCHLORIDE SCH MG: 200 TABLET ORAL at 09:19

## 2017-09-28 RX ADMIN — DIGOXIN SCH MG: 125 TABLET ORAL at 09:19

## 2017-09-28 RX ADMIN — METOPROLOL SUCCINATE SCH MG: 50 TABLET, FILM COATED, EXTENDED RELEASE ORAL at 19:35

## 2017-09-28 RX ADMIN — PHENYTOIN SODIUM SCH MLS/HR: 50 INJECTION INTRAMUSCULAR; INTRAVENOUS at 18:55

## 2017-09-28 RX ADMIN — ASPIRIN 81 MG SCH MG: 81 TABLET ORAL at 09:19

## 2017-09-28 RX ADMIN — DABIGATRAN ETEXILATE MESYLATE SCH MG: 150 CAPSULE ORAL at 19:35

## 2017-09-28 RX ADMIN — ATORVASTATIN CALCIUM SCH MG: 40 TABLET, FILM COATED ORAL at 19:35

## 2017-09-28 RX ADMIN — PHENYTOIN SODIUM SCH MLS/HR: 50 INJECTION INTRAMUSCULAR; INTRAVENOUS at 09:38

## 2017-09-28 RX ADMIN — DILTIAZEM HYDROCHLORIDE SCH MG: 240 CAPSULE, EXTENDED RELEASE ORAL at 09:19

## 2017-09-28 RX ADMIN — ISOSORBIDE MONONITRATE SCH MG: 30 TABLET, EXTENDED RELEASE ORAL at 09:36

## 2017-09-28 RX ADMIN — SODIUM CHLORIDE SCH MLS/HR: 900 INJECTION INTRAVENOUS at 19:36

## 2017-09-28 RX ADMIN — LEVOTHYROXINE SODIUM SCH MCG: 88 TABLET ORAL at 09:37

## 2017-09-28 RX ADMIN — ACYCLOVIR SCH TAB: 800 TABLET ORAL at 09:19

## 2017-09-28 NOTE — EKG
Date Performed: 2017       Time Performed: 01:23:29

 

PTAGE:      77 years

 

EKG:      UNCERTAIN REGULAR RHYTHM INTRAVENTRICULAR CONDUCTION DELAY ABNORMAL ECG Since 

 

 PREVIOUS TRACING            , no significant change noted PREVIOUS TRACIN2017 01.22

 

DOCTOR:   Avelina Gilmore  Interpretating Date/Time  2017 17:07:48

## 2017-09-28 NOTE — RADRPT
EXAM DATE/TIME:  09/28/2017 01:21 

 

[facility] COMPARISON:     

CHEST SINGLE AP, September 09, 2017, 15:27.

 

                     

INDICATIONS :     

Short of breath.

                     

 

MEDICAL HISTORY :            

Hypertension. Congestive heart failure. Myocardial infarction.   

 

SURGICAL HISTORY :     

CABG.   

 

ENCOUNTER:     

Initial                                        

 

ACUITY:     

1 day      

 

PAIN SCORE:     

0/10

 

LOCATION:     

Bilateral chest 

 

FINDINGS:     

A single view of the chest demonstrates cardiomegaly. Mild basilar atelectasis. Scarring at the left 
costophrenic angle. Postoperative CABG. No pneumothorax.

 

CONCLUSION:     

1. Cardiomegaly with minimal basilar atelectasis. Stable left basilar scarring. Findings similar to S
eptember 9.

 

 

 

 Eben Stoddard MD on September 28, 2017 at 1:52           

Board Certified Radiologist.

 This report was verified electronically.

## 2017-09-28 NOTE — RADRPT
EXAM DATE/TIME:  09/28/2017 01:30 

 

[facility] COMPARISON:     

No previous studies available for comparison.

 

 

INDICATIONS :     

Altered mental status.

                      

 

RADIATION DOSE:     

34.21 CTDIvol (mGy) 

 

 

 

MEDICAL HISTORY :     

Cardiovascular disease. Hypertension. 

 

SURGICAL HISTORY :      

None. 

 

ENCOUNTER:      

Initial

 

ACUITY:      

1 day

 

PAIN SCALE:      

0/10

 

LOCATION:        

cranial 

 

TECHNIQUE:     

Multiple contiguous axial images were obtained of the head.  Using automated exposure control and adj
ustment of the mA and/or kV according to patient size, radiation dose was kept as low as reasonably a
chievable to obtain optimal diagnostic quality images.   DICOM format image data is available electro
nically for review and comparison.  

 

FINDINGS:     

The exam is degraded by motion artifacts. No mass, hemorrhage or shift. No hydrocephalus. No acute parvez
ny abnormalities.

 

CONCLUSION:     

1. No acute findings. Exam degraded by motion.

 

 

 

 Eben Stoddard MD on September 28, 2017 at 1:39           

Board Certified Radiologist.

 This report was verified electronically.
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
## 2017-09-28 NOTE — PD
HPI


Chief Complaint:  S/P FALL AND CONFUSION


Time Seen by Provider:  00:48


Travel History


International Travel<30 days:  No


Contact w/Intl Traveler<30days:  No


Traveled to known affect area:  No





History of Present Illness


HPI


PER BROTHER HE DIDN'T SEEM HIMSELF, SEEM CONFUSED AND WHILE AMBULATING, FELL 

DOWN, NO LOC, 911 CALLED AND PATIENT TRANSPORTED





formerly [facility]


Past Medical History


Heart Rhythm Problems:  Yes (A-FIB/A-FLUTTER)


Cancer:  No


Cardiac Catheterization:  Yes (stenting 2000)


Cardiovascular Problems:  Yes


High Cholesterol:  Yes


Chest Pain:  Yes


Congestive Heart Failure:  Yes


Coronary Artery Disease:  Yes


Diabetes:  No


Diminished Hearing:  No


Endocrine:  Yes


Gastrointestinal Disorders:  No


Glaucoma:  No


Genitourinary:  Yes


Hepatitis:  No


Hiatal Hernia:  Yes


Hypertension:  Yes


Immune Disorder:  No


Inguinal Hernia:  Yes (bilat)


Musculoskeletal:  No


Neurologic:  No


Psychiatric:  No


Reproductive:  No


Respiratory:  Yes


Integumentary:  No


Myocardial Infarction:  Yes


Radiation Therapy:  Yes (FOR THYROID )


Shingles:  Yes (1990)


Thyroid Disease:  Yes





Past Surgical History


Abdominal Surgery:  Yes (DOROTEO HERNIA REPAIR, HEMORRHOIDS 2X)


Cardiac Surgery:  Yes (3X BIPASS SURGERY 9 YRS AGO, STENT/cardiac ablation 2013)


Coronary Artery Bypass Graft:  Yes (2001)


Ear Surgery:  No


Endocrine Surgery:  No


Eye Surgery:  No


Genitourinary Surgery:  No


Gynecologic Surgery:  No


Oral Surgery:  No


Thoracic Surgery:  No


Other Surgery:  Yes





Social History


Alcohol Use:  No


Tobacco Use:  Yes (former)


Substance Use:  No





Allergies-Medications


(Allergen,Severity, Reaction):  


Coded Allergies:  


     latex (Unverified  Allergy, Mild, rash, 9/9/17)


Reported Meds & Prescriptions





Reported Meds & Active Scripts


Active


Aspirin Low Strength (Aspirin) 81 Mg Chew 81 Mg CHEW DAILY


Amiodarone (Amiodarone HCl) 200 Mg Tab 200 Mg PO DAILY


Reported


Pradaxa (Dabigatran) 150 Mg Cap 150 Mg PO BID


Nitrostat SL (Nitroglycerin) 0.4 Mg Subl 0.4 Mg SL AS DIRECTED PRN


     1 tablet under the tongue as needed for chest pain. Repeat every 5


     minutes for a total of 3 DOSES or call 911 if NO relief.


Metoprolol Succinate ER 24 HR (Metoprolol Succinate) 50 Mg Tab 1.5 Tab PO HS


Isosorbide Mononitrate ER (Isosorbide Mononitrate) 30 Mg Marilou 30 Mg PO DAILY


Digoxin 0.125 Mg Tab 0.125 Mg PO DAILY


Multi-Vitamin Daily (Multiple Vitamin) 1 Tab Tab 1 Tab PO DAILY


Cartia Xt (Diltiazem ER 24 HR) 120 Mg Caper 240 Mg PO DAILY


Calcium 600 with Vitamin D (Calcium Carbonate-Cholecalciferol) 600-400 mg-Unit 

Tab 1 Tab PO DAILY


Atorvastatin (Atorvastatin Calcium) 40 Mg Tab 40 Mg PO HS








Review of Systems


ROS Limitations:  Other: (CONFUSED, NOT HIS NORMAL)


Except as stated in HPI:  all other systems reviewed are Neg


General / Constitutional:  Positive: Other (GENERALIZED WEAKNESS)


Genitourinary:  Positive: Other (INDWELLING ZAMORA)





Physical Exam


Narrative


GENERAL: 


SKIN: Warm and dry.


HEAD: Atraumatic. Normocephalic. 


EYES: Pupils equal and round. No scleral icterus. No injection or drainage. 


ENT: No nasal bleeding or discharge.  Mucous membranes pink and moist.


NECK: Trachea midline. No JVD. 


CARDIOVASCULAR: Regular rate and rhythm.  


RESPIRATORY: No accessory muscle use. Clear to auscultation. Breath sounds 

equal bilaterally. 


GASTROINTESTINAL: Abdomen soft, non-tender, nondistended. INDWELLING ZAMORA WITH 

CLOUDY URINE


MUSCULOSKELETAL: Extremities without clubbing, cyanosis, or edema. No obvious 

deformities. 


NEUROLOGICAL: Awake AND PLEASANTLY CONFUSED. No obvious cranial nerve deficits.

  Motor grossly within normal limits. Five out of 5 muscle strength in the arms 

BUT 4/5 ON BILATERAL LE legs.  Normal speech.


PSYCHIATRIC: Appropriate mood and affect; insight and judgment normal.





Data


Data


Last Documented VS





Vital Signs








  Date Time  Temp Pulse Resp B/P (MAP) Pulse Ox O2 Delivery O2 Flow Rate FiO2


 


9/28/17 01:10  52 18 136/55 (82) 98 Room Air  


 


9/28/17 00:48 97.5       








Orders





 Orders


Electrocardiogram (9/28/17 00:51)


Ammonia (9/28/17 00:51)


Complete Blood Count With Diff (9/28/17 00:51)


Comprehensive Metabolic Panel (9/28/17 00:51)


Prothrombin Time / Inr (Pt) (9/28/17 00:51)


Act Partial Throm Time (Ptt) (9/28/17 00:51)


Troponin I (9/28/17 00:51)


Thyroid Stimulating Hormone (9/28/17 00:51)


Urinalysis - C+S If Indicated (9/28/17 00:51)


Lactic Acid Sepsis Protocol (9/28/17 00:51)


Chest, Single Ap (9/28/17 00:51)


Ct Brain W/O Iv Contrast(Rout) (9/28/17 00:51)


Blood Glucose (9/28/17 00:51)


Ecg Monitoring (9/28/17 00:51)


Iv Access Insert/Monitor (9/28/17 00:51)


Oximetry (9/28/17 00:51)


Sodium Chloride 0.9% Flush (Ns Flush) (9/28/17 01:00)


Sodium Chlorid 0.9% 500 Ml Inj (Ns 500 M (9/28/17 02:00)


Ceftriaxone Inj (Rocephin Inj) (9/28/17 02:00)


Sodium Chlorid 0.9% 500 Ml Inj (Ns 500 M (9/28/17 02:30)


Admit Order (Ed Use Only) (9/28/17 02:26)





Labs





Laboratory Tests








Test


  9/28/17


01:00


 


White Blood Count 15.9 TH/MM3 


 


Red Blood Count 4.54 MIL/MM3 


 


Hemoglobin 13.9 GM/DL 


 


Hematocrit 40.7 % 


 


Mean Corpuscular Volume 89.7 FL 


 


Mean Corpuscular Hemoglobin 30.6 PG 


 


Mean Corpuscular Hemoglobin


Concent 34.1 % 


 


 


Red Cell Distribution Width 15.0 % 


 


Platelet Count 259 TH/MM3 


 


Mean Platelet Volume 7.0 FL 


 


Neutrophils (%) (Auto) 90.3 % 


 


Lymphocytes (%) (Auto) 5.6 % 


 


Monocytes (%) (Auto) 3.6 % 


 


Eosinophils (%) (Auto) 0.3 % 


 


Basophils (%) (Auto) 0.2 % 


 


Neutrophils # (Auto) 14.4 TH/MM3 


 


Lymphocytes # (Auto) 0.9 TH/MM3 


 


Monocytes # (Auto) 0.6 TH/MM3 


 


Eosinophils # (Auto) 0.0 TH/MM3 


 


Basophils # (Auto) 0.0 TH/MM3 


 


CBC Comment DIFF FINAL 


 


Differential Comment  


 


Prothrombin Time 14.2 SEC 


 


Prothromb Time International


Ratio 1.3 RATIO 


 


 


Activated Partial


Thromboplast Time 35.6 SEC 


 


 


Blood Urea Nitrogen 20 MG/DL 


 


Creatinine 1.33 MG/DL 


 


Random Glucose 166 MG/DL 


 


Total Protein 7.0 GM/DL 


 


Albumin 3.7 GM/DL 


 


Calcium Level 9.2 MG/DL 


 


Alkaline Phosphatase 79 U/L 


 


Aspartate Amino Transf


(AST/SGOT) 19 U/L 


 


 


Alanine Aminotransferase


(ALT/SGPT) 26 U/L 


 


 


Total Bilirubin 1.1 MG/DL 


 


Sodium Level 133 MEQ/L 


 


Potassium Level 4.1 MEQ/L 


 


Chloride Level 98 MEQ/L 


 


Carbon Dioxide Level 24.2 MEQ/L 


 


Anion Gap 11 MEQ/L 


 


Estimat Glomerular Filtration


Rate 52 ML/MIN 


 


 


Lactic Acid Level 4.1 mmol/L 


 


Ammonia 15 MCMOL/L 


 


Troponin I


  LESS THAN 0.02


NG/ML


 


Thyroid Stimulating Hormone


3rd Gen 12.100 uIU/ML 


 











MDM


Medical Decision Making


Medical Screen Exam Complete:  Yes


Emergency Medical Condition:  Yes


Medical Record Reviewed:  Yes


Interpretation(s)


AFIB WITH CVR AROUND 50'S, NO STEMI PATTERN


Differential Diagnosis


AMS (ICH V UTI V PNA V ELECTROLYTE ABNL


Narrative Course


DURING EVALUAITON PATIENT WAS FOUND TO HAVE E/O LACTIC ACIDOSIS, ALONG WITH UTI 

FROM HIS INDWELLING CATHETER.  PATIENT ALSO WAS FOUND TO HAVE HYPOTHYROIDISM 

BASED ON SCREENING TSH... INDIVIDUALLY AND DEFINITELY IN AGGREGRATE CAN CAUSE 

AMS.





Diagnosis





 Primary Impression:  


 AMS


 Additional Impressions:  


 UROSEPSIS 


 Lactic acidosis


 HYPOTHYROIDISM





Admitting Information


Admitting Physician Requests:  Observation











Eugene Pearson MD Sep 28, 2017 00:51

## 2017-09-29 VITALS
RESPIRATION RATE: 16 BRPM | OXYGEN SATURATION: 94 % | HEART RATE: 46 BPM | DIASTOLIC BLOOD PRESSURE: 60 MMHG | TEMPERATURE: 97.5 F | SYSTOLIC BLOOD PRESSURE: 120 MMHG

## 2017-09-29 VITALS
HEART RATE: 49 BPM | DIASTOLIC BLOOD PRESSURE: 80 MMHG | SYSTOLIC BLOOD PRESSURE: 142 MMHG | TEMPERATURE: 98.4 F | OXYGEN SATURATION: 98 % | RESPIRATION RATE: 18 BRPM

## 2017-09-29 VITALS — HEART RATE: 48 BPM

## 2017-09-29 VITALS — HEART RATE: 46 BPM

## 2017-09-29 VITALS
RESPIRATION RATE: 17 BRPM | SYSTOLIC BLOOD PRESSURE: 136 MMHG | HEART RATE: 50 BPM | DIASTOLIC BLOOD PRESSURE: 68 MMHG | OXYGEN SATURATION: 96 % | TEMPERATURE: 98 F

## 2017-09-29 VITALS
DIASTOLIC BLOOD PRESSURE: 60 MMHG | HEART RATE: 47 BPM | SYSTOLIC BLOOD PRESSURE: 119 MMHG | OXYGEN SATURATION: 97 % | RESPIRATION RATE: 18 BRPM | TEMPERATURE: 97.5 F

## 2017-09-29 VITALS — HEART RATE: 45 BPM

## 2017-09-29 VITALS — HEART RATE: 49 BPM

## 2017-09-29 VITALS — HEART RATE: 52 BPM

## 2017-09-29 VITALS — HEART RATE: 50 BPM

## 2017-09-29 VITALS — HEART RATE: 44 BPM

## 2017-09-29 VITALS — HEART RATE: 51 BPM

## 2017-09-29 VITALS
OXYGEN SATURATION: 95 % | RESPIRATION RATE: 18 BRPM | SYSTOLIC BLOOD PRESSURE: 148 MMHG | DIASTOLIC BLOOD PRESSURE: 83 MMHG | TEMPERATURE: 98.1 F | HEART RATE: 55 BPM

## 2017-09-29 VITALS
OXYGEN SATURATION: 97 % | SYSTOLIC BLOOD PRESSURE: 137 MMHG | DIASTOLIC BLOOD PRESSURE: 77 MMHG | RESPIRATION RATE: 18 BRPM | HEART RATE: 50 BPM | TEMPERATURE: 98.6 F

## 2017-09-29 VITALS — HEART RATE: 56 BPM

## 2017-09-29 VITALS — HEART RATE: 43 BPM

## 2017-09-29 LAB
ANION GAP SERPL CALC-SCNC: 7 MEQ/L (ref 5–15)
BASOPHILS # BLD AUTO: 0 TH/MM3 (ref 0–0.2)
BASOPHILS NFR BLD: 0.2 % (ref 0–2)
BUN SERPL-MCNC: 13 MG/DL (ref 7–18)
CHLORIDE SERPL-SCNC: 102 MEQ/L (ref 98–107)
EOSINOPHIL # BLD: 0 TH/MM3 (ref 0–0.4)
EOSINOPHIL NFR BLD: 0.2 % (ref 0–4)
ERYTHROCYTE [DISTWIDTH] IN BLOOD BY AUTOMATED COUNT: 15.3 % (ref 11.6–17.2)
GFR SERPLBLD BASED ON 1.73 SQ M-ARVRAT: 74 ML/MIN (ref 89–?)
HCO3 BLD-SCNC: 28.4 MEQ/L (ref 21–32)
HCT VFR BLD CALC: 37.1 % (ref 39–51)
HEMO FLAGS: (no result)
LYMPHOCYTES # BLD AUTO: 0.9 TH/MM3 (ref 1–4.8)
LYMPHOCYTES NFR BLD AUTO: 10.3 % (ref 9–44)
MCH RBC QN AUTO: 30.3 PG (ref 27–34)
MCHC RBC AUTO-ENTMCNC: 33.8 % (ref 32–36)
MCV RBC AUTO: 89.6 FL (ref 80–100)
MONOCYTES NFR BLD: 7.4 % (ref 0–8)
NEUTROPHILS # BLD AUTO: 7.5 TH/MM3 (ref 1.8–7.7)
NEUTROPHILS NFR BLD AUTO: 81.9 % (ref 16–70)
PLATELET # BLD: 250 TH/MM3 (ref 150–450)
POTASSIUM SERPL-SCNC: 4 MEQ/L (ref 3.5–5.1)
RBC # BLD AUTO: 4.15 MIL/MM3 (ref 4.5–5.9)
SODIUM SERPL-SCNC: 137 MEQ/L (ref 136–145)
WBC # BLD AUTO: 9.2 TH/MM3 (ref 4–11)

## 2017-09-29 RX ADMIN — ACYCLOVIR SCH TAB: 800 TABLET ORAL at 08:46

## 2017-09-29 RX ADMIN — LEVOTHYROXINE SODIUM SCH MCG: 88 TABLET ORAL at 05:56

## 2017-09-29 RX ADMIN — ATORVASTATIN CALCIUM SCH MG: 40 TABLET, FILM COATED ORAL at 20:55

## 2017-09-29 RX ADMIN — SODIUM CHLORIDE SCH MLS/HR: 900 INJECTION INTRAVENOUS at 20:54

## 2017-09-29 RX ADMIN — DABIGATRAN ETEXILATE MESYLATE SCH MG: 150 CAPSULE ORAL at 20:54

## 2017-09-29 RX ADMIN — PHENYTOIN SODIUM SCH MLS/HR: 50 INJECTION INTRAMUSCULAR; INTRAVENOUS at 05:58

## 2017-09-29 RX ADMIN — DILTIAZEM HYDROCHLORIDE SCH MG: 240 CAPSULE, EXTENDED RELEASE ORAL at 08:46

## 2017-09-29 RX ADMIN — METOPROLOL SUCCINATE SCH MG: 50 TABLET, FILM COATED, EXTENDED RELEASE ORAL at 20:54

## 2017-09-29 RX ADMIN — ASPIRIN 81 MG SCH MG: 81 TABLET ORAL at 08:46

## 2017-09-29 RX ADMIN — ISOSORBIDE MONONITRATE SCH MG: 30 TABLET, EXTENDED RELEASE ORAL at 05:56

## 2017-09-29 RX ADMIN — DABIGATRAN ETEXILATE MESYLATE SCH MG: 150 CAPSULE ORAL at 08:46

## 2017-09-29 NOTE — HHI.PR
Subjective


Remarks


Patient awake and alert today


offers no specific complaints at this time





Objective


Vitals





Vital Signs








  Date Time  Temp Pulse Resp B/P (MAP) Pulse Ox O2 Delivery O2 Flow Rate FiO2


 


9/29/17 06:01  52      


 


9/29/17 05:09  49      


 


9/29/17 04:08  48      


 


9/29/17 03:23  44      


 


9/29/17 03:00 98.0 50 17 136/68 (90) 96   


 


9/29/17 02:36  43      


 


9/29/17 01:44  45      


 


9/29/17 00:41  56      


 


9/28/17 23:30  47      


 


9/28/17 23:00 97.6 49 16 141/84 (103) 98   


 


9/28/17 22:00  50      


 


9/28/17 21:32  52      


 


9/28/17 20:00  52      


 


9/28/17 19:00  47      


 


9/28/17 19:00 98.2 48 16 123/67 (85) 98   


 


9/28/17 18:21  63      


 


9/28/17 17:20  56      


 


9/28/17 16:07  51      


 


9/28/17 15:44  52      


 


9/28/17 15:44 97.8 52 18 142/77 (98) 96   


 


9/28/17 14:00  52      


 


9/28/17 13:00  56      


 


9/28/17 12:00  52      


 


9/28/17 11:00 97.9 56 20 149/83 (105) 98   


 


9/28/17 11:00  55      








Result Diagram:  


9/29/17 0638                                                                   

             9/29/17 0638





Other Results





 Laboratory Tests








Test


  9/28/17


01:00 9/28/17


03:00 9/28/17


03:07 9/28/17


14:30


 


White Blood Count 15.9 TH/MM3    


 


Red Blood Count 4.54 MIL/MM3    


 


Hemoglobin 13.9 GM/DL    


 


Hematocrit 40.7 %    


 


Mean Corpuscular Volume 89.7 FL    


 


Mean Corpuscular Hemoglobin 30.6 PG    


 


Mean Corpuscular Hemoglobin


Concent 34.1 % 


  


  


  


 


 


Red Cell Distribution Width 15.0 %    


 


Platelet Count 259 TH/MM3    


 


Mean Platelet Volume 7.0 FL    


 


Neutrophils (%) (Auto) 90.3 %    


 


Lymphocytes (%) (Auto) 5.6 %    


 


Monocytes (%) (Auto) 3.6 %    


 


Eosinophils (%) (Auto) 0.3 %    


 


Basophils (%) (Auto) 0.2 %    


 


Neutrophils # (Auto) 14.4 TH/MM3    


 


Lymphocytes # (Auto) 0.9 TH/MM3    


 


Monocytes # (Auto) 0.6 TH/MM3    


 


Eosinophils # (Auto) 0.0 TH/MM3    


 


Basophils # (Auto) 0.0 TH/MM3    


 


CBC Comment DIFF FINAL    


 


Differential Comment     


 


Prothrombin Time 14.2 SEC    


 


Prothromb Time International


Ratio 1.3 RATIO 


  


  


  


 


 


Activated Partial


Thromboplast Time 35.6 SEC 


  


  


  


 


 


Blood Urea Nitrogen 20 MG/DL    16 MG/DL 


 


Creatinine 1.33 MG/DL    1.04 MG/DL 


 


Random Glucose 166 MG/DL    76 MG/DL 


 


Total Protein 7.0 GM/DL    


 


Albumin 3.7 GM/DL    


 


Calcium Level 9.2 MG/DL    9.4 MG/DL 


 


Alkaline Phosphatase 79 U/L    


 


Aspartate Amino Transf


(AST/SGOT) 19 U/L 


  


  


  


 


 


Alanine Aminotransferase


(ALT/SGPT) 26 U/L 


  


  


  


 


 


Total Bilirubin 1.1 MG/DL    


 


Sodium Level 133 MEQ/L    136 MEQ/L 


 


Potassium Level 4.1 MEQ/L    5.2 MEQ/L 


 


Chloride Level 98 MEQ/L    102 MEQ/L 


 


Carbon Dioxide Level 24.2 MEQ/L    30.0 MEQ/L 


 


Anion Gap 11 MEQ/L    4 MEQ/L 


 


Estimat Glomerular Filtration


Rate 52 ML/MIN 


  


  


  70 ML/MIN 


 


 


Lactic Acid Level 4.1 mmol/L  1.1 mmol/L   


 


Ammonia 15 MCMOL/L    


 


Troponin I


  LESS THAN 0.02


NG/ML 


  


  


 


 


Thyroid Stimulating Hormone


3rd Gen 12.100 uIU/ML 


  


  


  


 


 


Urine Color   LIGHT-RED  


 


Urine Turbidity   HAZY  


 


Urine pH   6.0  


 


Urine Specific Gravity   1.024  


 


Urine Protein   30 mg/dL  


 


Urine Glucose (UA)   NEG mg/dL  


 


Urine Ketones   NEG mg/dL  


 


Urine Occult Blood   MOD  


 


Urine Nitrite   NEG  


 


Urine Bilirubin   NEG  


 


Urine Urobilinogen   2.0 MG/DL  


 


Urine Leukocyte Esterase   LARGE  


 


Urine RBC    /hpf  


 


Urine WBC   169 /hpf  


 


Urine Bacteria   RARE /hpf  


 


Urine Hyaline Casts   5 /lpf  


 


Urine Mucus   FEW /lpf  


 


Microscopic Urinalysis Comment


  


  


  CATH-CULTURE


IND 


 


 


Test


  9/29/17


06:38 


  


  


 


 


White Blood Count 9.2 TH/MM3    


 


Red Blood Count 4.15 MIL/MM3    


 


Hemoglobin 12.6 GM/DL    


 


Hematocrit 37.1 %    


 


Mean Corpuscular Volume 89.6 FL    


 


Mean Corpuscular Hemoglobin 30.3 PG    


 


Mean Corpuscular Hemoglobin


Concent 33.8 % 


  


  


  


 


 


Red Cell Distribution Width 15.3 %    


 


Platelet Count 250 TH/MM3    


 


Mean Platelet Volume 6.9 FL    


 


Neutrophils (%) (Auto) 81.9 %    


 


Lymphocytes (%) (Auto) 10.3 %    


 


Monocytes (%) (Auto) 7.4 %    


 


Eosinophils (%) (Auto) 0.2 %    


 


Basophils (%) (Auto) 0.2 %    


 


Neutrophils # (Auto) 7.5 TH/MM3    


 


Lymphocytes # (Auto) 0.9 TH/MM3    


 


Monocytes # (Auto) 0.7 TH/MM3    


 


Eosinophils # (Auto) 0.0 TH/MM3    


 


Basophils # (Auto) 0.0 TH/MM3    


 


CBC Comment DIFF FINAL    


 


Differential Comment     


 


Blood Urea Nitrogen 13 MG/DL    


 


Creatinine 0.99 MG/DL    


 


Random Glucose 63 MG/DL    


 


Calcium Level 8.8 MG/DL    


 


Sodium Level 137 MEQ/L    


 


Potassium Level 4.0 MEQ/L    


 


Chloride Level 102 MEQ/L    


 


Carbon Dioxide Level 28.4 MEQ/L    


 


Anion Gap 7 MEQ/L    


 


Estimat Glomerular Filtration


Rate 74 ML/MIN 


  


  


  


 








Imaging





Last Impressions








Head CT 9/28/17 0051 Signed





Impressions: 





 Service Date/Time:  Thursday, September 28, 2017 01:30 - CONCLUSION:  1. No 





 acute findings. Exam degraded by motion.     Eben Stoddard MD 


 


Chest X-Ray 9/28/17 0051 Signed





Impressions: 





 Service Date/Time:  Thursday, September 28, 2017 01:21 - CONCLUSION:  1. 





 Cardiomegaly with minimal basilar atelectasis. Stable left basilar scarring. 





 Findings similar to September 9.     Eben Stoddard MD 








Objective Remarks


GENERAL: 77 ear old male patient in no acute distress


HEART:  Bradycardic


LUNGS:  Clear.


ABDOMEN:  Soft, nontender, no masses.  He has a Fisher catheter in place.


EXTREMITIES:  No edema.  Pulses are palpated.  No calf tenderness.


NEUROLOGIC:    He does move all extremities.





A/P


Problem List:  


(1) Altered mental status


ICD Codes:  R41.82 - Altered mental status, unspecified


Plan:  AMS likely secondary to metabolic encephalopathy secondary to UTI


mental status improving


WBC improve 15.9 on admission to 9.2 (9/29)


Patient started on Rocephin IV 


await urine culture results


recheck CBC In AM





hx A Fib RVR 


S/P Ablation 


currently bradycardic HR in the 40-50's


patient currently on digoxin and amiodarone and Pradaxa


Will DC digoxin





Hypothyroidism


TSH elevated 12


Synthroid increased to 88mcg per day


Patient will need repeat TSH and T4 in 6 weeks





HTN 


controlled


continue home medications





anemia


Hgb dropped slightly likely to dilution- no s/s of active bleeding


will DC IV fluids and recheck in AM





(2) UTI (urinary tract infection)


ICD Codes:  N39.0 - Urinary tract infection, site not specified


Plan:  see above





(3) Hypothyroid


ICD Codes:  E03.9 - Hypothyroidism, unspecified


Status:  Chronic


Plan:  see above





(4) Afib


ICD Codes:  I48.91 - Unspecified atrial fibrillation


Status:  Chronic


Plan:  see above





Assessment and Plan


Patient examined.


Assessment and plan formulated with Haleigh Sutherland PA-C.


I agree with the above.











Haleigh Sutherland Sep 29, 2017 10:38


Jaden Guzman DO Sep 30, 2017 10:04

## 2017-09-30 VITALS
DIASTOLIC BLOOD PRESSURE: 81 MMHG | SYSTOLIC BLOOD PRESSURE: 143 MMHG | HEART RATE: 59 BPM | RESPIRATION RATE: 16 BRPM | TEMPERATURE: 97.9 F | OXYGEN SATURATION: 97 %

## 2017-09-30 VITALS
RESPIRATION RATE: 18 BRPM | SYSTOLIC BLOOD PRESSURE: 138 MMHG | HEART RATE: 54 BPM | OXYGEN SATURATION: 97 % | TEMPERATURE: 97.7 F | DIASTOLIC BLOOD PRESSURE: 80 MMHG

## 2017-09-30 VITALS — HEART RATE: 56 BPM

## 2017-09-30 VITALS — HEART RATE: 55 BPM

## 2017-09-30 VITALS
OXYGEN SATURATION: 98 % | TEMPERATURE: 98.2 F | HEART RATE: 54 BPM | SYSTOLIC BLOOD PRESSURE: 117 MMHG | RESPIRATION RATE: 16 BRPM | DIASTOLIC BLOOD PRESSURE: 67 MMHG

## 2017-09-30 VITALS — HEART RATE: 52 BPM

## 2017-09-30 VITALS — HEART RATE: 53 BPM

## 2017-09-30 VITALS — HEART RATE: 54 BPM

## 2017-09-30 LAB
BASOPHILS # BLD AUTO: 0 TH/MM3 (ref 0–0.2)
BASOPHILS NFR BLD: 0.4 % (ref 0–2)
EOSINOPHIL # BLD: 0.1 TH/MM3 (ref 0–0.4)
EOSINOPHIL NFR BLD: 1.6 % (ref 0–4)
ERYTHROCYTE [DISTWIDTH] IN BLOOD BY AUTOMATED COUNT: 15.2 % (ref 11.6–17.2)
HCT VFR BLD CALC: 39.4 % (ref 39–51)
HEMO FLAGS: (no result)
LYMPHOCYTES # BLD AUTO: 1.5 TH/MM3 (ref 1–4.8)
LYMPHOCYTES NFR BLD AUTO: 19.1 % (ref 9–44)
MCH RBC QN AUTO: 30.7 PG (ref 27–34)
MCHC RBC AUTO-ENTMCNC: 34 % (ref 32–36)
MCV RBC AUTO: 90.2 FL (ref 80–100)
MONOCYTES NFR BLD: 10.3 % (ref 0–8)
NEUTROPHILS # BLD AUTO: 5.3 TH/MM3 (ref 1.8–7.7)
NEUTROPHILS NFR BLD AUTO: 68.6 % (ref 16–70)
PLATELET # BLD: 242 TH/MM3 (ref 150–450)
RBC # BLD AUTO: 4.36 MIL/MM3 (ref 4.5–5.9)
WBC # BLD AUTO: 7.7 TH/MM3 (ref 4–11)

## 2017-09-30 RX ADMIN — AMIODARONE HYDROCHLORIDE SCH MG: 200 TABLET ORAL at 08:00

## 2017-09-30 RX ADMIN — ACYCLOVIR SCH TAB: 800 TABLET ORAL at 07:59

## 2017-09-30 RX ADMIN — ISOSORBIDE MONONITRATE SCH MG: 30 TABLET, EXTENDED RELEASE ORAL at 06:05

## 2017-09-30 RX ADMIN — DILTIAZEM HYDROCHLORIDE SCH MG: 240 CAPSULE, EXTENDED RELEASE ORAL at 08:00

## 2017-09-30 RX ADMIN — DABIGATRAN ETEXILATE MESYLATE SCH MG: 150 CAPSULE ORAL at 08:00

## 2017-09-30 RX ADMIN — DIGOXIN SCH MG: 125 TABLET ORAL at 08:02

## 2017-09-30 RX ADMIN — ASPIRIN 81 MG SCH MG: 81 TABLET ORAL at 08:00

## 2017-09-30 RX ADMIN — DIGOXIN SCH MG: 125 TABLET ORAL at 08:00

## 2017-09-30 RX ADMIN — LEVOTHYROXINE SODIUM SCH MCG: 88 TABLET ORAL at 06:05
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
## 2018-05-01 ENCOUNTER — HOSPITAL ENCOUNTER (EMERGENCY)
Dept: HOSPITAL 17 - PHED | Age: 78
Discharge: HOME | End: 2018-05-01
Payer: MEDICARE

## 2018-05-01 VITALS
TEMPERATURE: 97.7 F | DIASTOLIC BLOOD PRESSURE: 66 MMHG | SYSTOLIC BLOOD PRESSURE: 122 MMHG | HEART RATE: 99 BPM | RESPIRATION RATE: 18 BRPM | OXYGEN SATURATION: 98 %

## 2018-05-01 VITALS — HEIGHT: 74 IN | BODY MASS INDEX: 21.25 KG/M2 | WEIGHT: 165.57 LBS

## 2018-05-01 DIAGNOSIS — I25.10: ICD-10-CM

## 2018-05-01 DIAGNOSIS — T83.9XXA: Primary | ICD-10-CM

## 2018-05-01 DIAGNOSIS — E78.00: ICD-10-CM

## 2018-05-01 DIAGNOSIS — I11.0: ICD-10-CM

## 2018-05-01 DIAGNOSIS — I48.91: ICD-10-CM

## 2018-05-01 DIAGNOSIS — I50.9: ICD-10-CM

## 2018-05-01 DIAGNOSIS — E03.9: ICD-10-CM

## 2018-05-01 DIAGNOSIS — E07.9: ICD-10-CM

## 2018-05-01 DIAGNOSIS — I25.2: ICD-10-CM

## 2018-05-01 PROCEDURE — 99281 EMR DPT VST MAYX REQ PHY/QHP: CPT

## 2018-05-01 NOTE — PD
HPI


Chief Complaint:  CLOGGED CATHETER


Time Seen by Provider:  03:42


Travel History


International Travel<30 days:  No


Contact w/Intl Traveler<30days:  No


Traveled to known affect area:  No





History of Present Illness


HPI


Patient comes in complaining of not noticing any urine out of his suprapubic 

catheter.  The patient is not in any major discomfort currently.  He is just 

concerned that he has not noticed much urine output and his collection bag.  

Patient tried to flush it and did not notice any change in urine output.  

Patient states that he has not being having any abdominal pain, no nausea no 

vomiting no diarrhea, he is otherwise in his normal state of health.  States 

that his urologist is Dr. MCNAIR


Patient denies any alleviating or aggravating factors currently.  Patient 

denies any associated factors such as fever, flank pain, nausea, vomiting, 

diarrhea, abdominal pain, chest pain, headache, no visual changes, no sore 

throat no cough and no runny nose.  Patient states that he still spontaneously 

voids through his penis, patient states that he just noticed that it was 

decreased urine output through his suprapubic catheter.  But he is not 

otherwise in any other severe pain or discomfort.








States allergy to latex


Past medical history significant for hypothyroidism, dentures, MI 9 years ago, 

congestive heart failure, triple bypass, ablation in 2013, hypercholesterolemia

, A. fib a flutter, hypertension, hiatal hernia, bilateral hernia repair, 

enlarged prostate, bilateral inguinal hernia,





PFSH


Past Medical History


Heart Rhythm Problems:  Yes (A-FIB/A-FLUTTER)


Cancer:  No


Cardiac Catheterization:  Yes (stenting 2000)


Cardiovascular Problems:  Yes


High Cholesterol:  Yes


Chest Pain:  Yes


Congestive Heart Failure:  Yes


Coronary Artery Disease:  Yes


Diabetes:  No


Diminished Hearing:  No


Endocrine:  Yes


Gastrointestinal Disorders:  Yes


Glaucoma:  No


Genitourinary:  Yes


Hepatitis:  No


Hiatal Hernia:  Yes


Hypertension:  Yes


Immune Disorder:  No


Inguinal Hernia:  Yes (bilat)


Musculoskeletal:  No


Neurologic:  No


Psychiatric:  No


Reproductive:  No


Respiratory:  Yes


Integumentary:  No


Myocardial Infarction:  Yes


Radiation Therapy:  Yes (FOR THYROID )


Shingles:  Yes (1990)


Thyroid Disease:  Yes





Past Surgical History


Abdominal Surgery:  Yes (DOROTEO HERNIA REPAIR, HEMORRHOIDS 2X)


Cardiac Surgery:  Yes (3X BIPASS SURGERY 9 YRS AGO, STENT/cardiac ablation 2013)


Coronary Artery Bypass Graft:  Yes (2001)


Ear Surgery:  No


Endocrine Surgery:  No


Eye Surgery:  No


Genitourinary Surgery:  No


Gynecologic Surgery:  No


Oral Surgery:  No


Thoracic Surgery:  No


Other Surgery:  Yes





Social History


Alcohol Use:  No


Tobacco Use:  Yes (former)


Substance Use:  No





Allergies-Medications


(Allergen,Severity, Reaction):  


Coded Allergies:  


     latex (Unverified  Allergy, Mild, rash, 5/1/18)


Reported Meds & Prescriptions





Reported Meds & Active Scripts


Active


Synthroid (Levothyroxine Sodium) 88 Mcg Tab 88 Mcg PO DAILY@0600


Aspirin Low Strength (Aspirin) 81 Mg Chew 81 Mg CHEW DAILY


Amiodarone (Amiodarone HCl) 200 Mg Tab 200 Mg PO DAILY


Reported


Tamsulosin (Tamsulosin HCl) 0.4 Mg Cap 0.4 Mg PO HS


Pradaxa (Dabigatran) 150 Mg Cap 150 Mg PO BID


Nitrostat SL (Nitroglycerin) 0.4 Mg Subl 0.4 Mg SL AS DIRECTED PRN


     1 tablet under the tongue as needed for chest pain. Repeat every 5


     minutes for a total of 3 DOSES or call 911 if NO relief.


Metoprolol Succinate ER 24 HR (Metoprolol Succinate) 50 Mg Tab 1.5 Tab PO HS


Isosorbide Mononitrate ER (Isosorbide Mononitrate) 30 Mg Marilou 30 Mg PO DAILY


Multi-Vitamin Daily (Multiple Vitamin) 1 Tab Tab 1 Tab PO DAILY


Cartia Xt (Diltiazem ER 24 HR) 120 Mg Caper 240 Mg PO DAILY


Calcium 600 with Vitamin D (Calcium Carbonate-Cholecalciferol) 600-400 mg-Unit 

Tab 1 Tab PO DAILY


Atorvastatin (Atorvastatin Calcium) 40 Mg Tab 40 Mg PO HS








Review of Systems


General / Constitutional:  No: Fever


Eyes:  No: Visual changes


HENT:  No: Headaches


Cardiovascular:  No: Chest Pain or Discomfort


Respiratory:  No: Shortness of Breath


Gastrointestinal:  No: Abdominal Pain


Genitourinary:  Positive: Decreased Urinary Output


Musculoskeletal:  No: Pain


Skin:  No Rash


Neurologic:  No: Weakness


Psychiatric:  No: Depression


Endocrine:  No: Polydipsia


Hematologic/Lymphatic:  No: Easy Bruising





Physical Exam


Narrative


GENERAL: 


SKIN: Warm and dry.


HEAD: Atraumatic. Normocephalic. 


EYES: Pupils equal and round. No scleral icterus. No injection or drainage. 


ENT: No nasal bleeding or discharge.  Mucous membranes pink and moist.


NECK: Trachea midline. No JVD. 


CARDIOVASCULAR: Regular rate and rhythm.  


RESPIRATORY: No accessory muscle use. Clear to auscultation. Breath sounds 

equal bilaterally. 


GASTROINTESTINAL: Abdomen soft, non-tender, nondistended.  A suprapubic 16 

French catheter is noted.


MUSCULOSKELETAL: Extremities without clubbing, cyanosis, or edema. No obvious 

deformities. 


NEUROLOGICAL: Awake and alert. No obvious cranial nerve deficits.  Motor 

grossly within normal limits. Five out of 5 muscle strength in the arms and 

legs.  Normal speech.


PSYCHIATRIC: Appropriate mood and affect; insight and judgment normal.





Data


Data


Last Documented VS





Vital Signs








  Date Time  Temp Pulse Resp B/P (MAP) Pulse Ox O2 Delivery O2 Flow Rate FiO2


 


5/1/18 03:38 97.7 99 18 122/66 (84) 98   











MDM


Medical Decision Making


Medical Screen Exam Complete:  Yes


Emergency Medical Condition:  Yes


Medical Record Reviewed:  Yes


Differential Diagnosis


Clogged suprapubic catheter versus catheter malfunction


Narrative Course


Nurse irrigated the suprapubic tube , exchanged the leg baG, but did not appear 

to drain adequately..... The patient feels comfortable and would like to go see 

his urologist to have it removed or replaced if that is what is necessary.  I 

agree with the patient's request and patient was not in any acute distress, so 

he will be referred to Dr. Nevarez for catheter care and possible replacement.  

A new leg bag will be given to the patient as well as irrigation set because he 

used his own.





Diagnosis





 Primary Impression:  


 Suprapubic catheter care


Referrals:  


Howard Nevarez MD


Call and make an appointment to follow-up today, to have your catheter 

evaluated for proper functioning


Patient Instructions:  Fisher Catheter Placement and Care (ED), General 

Instructions


Disposition:  01 DISCHARGE HOME


Condition:  Stable











Eugene Pearson MD May 1, 2018 03:46